# Patient Record
Sex: FEMALE | URBAN - METROPOLITAN AREA
[De-identification: names, ages, dates, MRNs, and addresses within clinical notes are randomized per-mention and may not be internally consistent; named-entity substitution may affect disease eponyms.]

---

## 2017-06-09 ENCOUNTER — EMERGENCY (EMERGENCY)
Facility: HOSPITAL | Age: 27
LOS: 1 days | Discharge: HOME | End: 2017-06-09
Attending: EMERGENCY MEDICINE
Payer: MEDICAID

## 2017-06-09 VITALS
RESPIRATION RATE: 16 BRPM | WEIGHT: 154.98 LBS | HEIGHT: 64 IN | OXYGEN SATURATION: 99 % | DIASTOLIC BLOOD PRESSURE: 83 MMHG | SYSTOLIC BLOOD PRESSURE: 120 MMHG | HEART RATE: 76 BPM | TEMPERATURE: 98 F

## 2017-06-09 DIAGNOSIS — R55 SYNCOPE AND COLLAPSE: ICD-10-CM

## 2017-06-09 LAB
ALBUMIN SERPL ELPH-MCNC: 4.6 G/DL — SIGNIFICANT CHANGE UP (ref 3.3–5.2)
ALP SERPL-CCNC: 96 U/L — SIGNIFICANT CHANGE UP (ref 40–120)
ALT FLD-CCNC: 17 U/L — SIGNIFICANT CHANGE UP
ANION GAP SERPL CALC-SCNC: 14 MMOL/L — SIGNIFICANT CHANGE UP (ref 5–17)
AST SERPL-CCNC: 15 U/L — SIGNIFICANT CHANGE UP
BASOPHILS # BLD AUTO: 0 K/UL — SIGNIFICANT CHANGE UP (ref 0–0.2)
BASOPHILS NFR BLD AUTO: 0.2 % — SIGNIFICANT CHANGE UP (ref 0–2)
BILIRUB SERPL-MCNC: 0.5 MG/DL — SIGNIFICANT CHANGE UP (ref 0.4–2)
BUN SERPL-MCNC: 19 MG/DL — SIGNIFICANT CHANGE UP (ref 8–20)
CALCIUM SERPL-MCNC: 9.3 MG/DL — SIGNIFICANT CHANGE UP (ref 8.6–10.2)
CHLORIDE SERPL-SCNC: 99 MMOL/L — SIGNIFICANT CHANGE UP (ref 98–107)
CO2 SERPL-SCNC: 26 MMOL/L — SIGNIFICANT CHANGE UP (ref 22–29)
CREAT SERPL-MCNC: 0.61 MG/DL — SIGNIFICANT CHANGE UP (ref 0.5–1.3)
EOSINOPHIL # BLD AUTO: 0 K/UL — SIGNIFICANT CHANGE UP (ref 0–0.5)
EOSINOPHIL NFR BLD AUTO: 0.2 % — SIGNIFICANT CHANGE UP (ref 0–6)
GLUCOSE SERPL-MCNC: 91 MG/DL — SIGNIFICANT CHANGE UP (ref 70–115)
HCG UR QL: NEGATIVE — SIGNIFICANT CHANGE UP
HCT VFR BLD CALC: 41.9 % — SIGNIFICANT CHANGE UP (ref 37–47)
HGB BLD-MCNC: 14.1 G/DL — SIGNIFICANT CHANGE UP (ref 12–16)
LYMPHOCYTES # BLD AUTO: 1.3 K/UL — SIGNIFICANT CHANGE UP (ref 1–4.8)
LYMPHOCYTES # BLD AUTO: 9.2 % — LOW (ref 20–55)
MCHC RBC-ENTMCNC: 29.6 PG — SIGNIFICANT CHANGE UP (ref 27–31)
MCHC RBC-ENTMCNC: 33.7 G/DL — SIGNIFICANT CHANGE UP (ref 32–36)
MCV RBC AUTO: 87.8 FL — SIGNIFICANT CHANGE UP (ref 81–99)
MONOCYTES # BLD AUTO: 1 K/UL — HIGH (ref 0–0.8)
MONOCYTES NFR BLD AUTO: 6.8 % — SIGNIFICANT CHANGE UP (ref 3–10)
NEUTROPHILS # BLD AUTO: 12 K/UL — HIGH (ref 1.8–8)
NEUTROPHILS NFR BLD AUTO: 83.3 % — HIGH (ref 37–73)
PLATELET # BLD AUTO: 306 K/UL — SIGNIFICANT CHANGE UP (ref 150–400)
POTASSIUM SERPL-MCNC: 4.2 MMOL/L — SIGNIFICANT CHANGE UP (ref 3.5–5.3)
POTASSIUM SERPL-SCNC: 4.2 MMOL/L — SIGNIFICANT CHANGE UP (ref 3.5–5.3)
PROT SERPL-MCNC: 8.3 G/DL — SIGNIFICANT CHANGE UP (ref 6.6–8.7)
RBC # BLD: 4.77 M/UL — SIGNIFICANT CHANGE UP (ref 4.4–5.2)
RBC # FLD: 12.6 % — SIGNIFICANT CHANGE UP (ref 11–15.6)
SODIUM SERPL-SCNC: 139 MMOL/L — SIGNIFICANT CHANGE UP (ref 135–145)
WBC # BLD: 14.4 K/UL — HIGH (ref 4.8–10.8)
WBC # FLD AUTO: 14.4 K/UL — HIGH (ref 4.8–10.8)

## 2017-06-09 PROCEDURE — 99284 EMERGENCY DEPT VISIT MOD MDM: CPT

## 2017-06-09 PROCEDURE — 81025 URINE PREGNANCY TEST: CPT

## 2017-06-09 PROCEDURE — 93010 ELECTROCARDIOGRAM REPORT: CPT

## 2017-06-09 PROCEDURE — 85027 COMPLETE CBC AUTOMATED: CPT

## 2017-06-09 PROCEDURE — 93005 ELECTROCARDIOGRAM TRACING: CPT

## 2017-06-09 PROCEDURE — 82962 GLUCOSE BLOOD TEST: CPT

## 2017-06-09 PROCEDURE — 80053 COMPREHEN METABOLIC PANEL: CPT

## 2017-06-09 PROCEDURE — 99284 EMERGENCY DEPT VISIT MOD MDM: CPT | Mod: 25

## 2017-06-09 RX ORDER — SODIUM CHLORIDE 9 MG/ML
1000 INJECTION INTRAMUSCULAR; INTRAVENOUS; SUBCUTANEOUS ONCE
Qty: 0 | Refills: 0 | Status: COMPLETED | OUTPATIENT
Start: 2017-06-09 | End: 2017-06-09

## 2017-06-09 RX ADMIN — SODIUM CHLORIDE 2000 MILLILITER(S): 9 INJECTION INTRAMUSCULAR; INTRAVENOUS; SUBCUTANEOUS at 13:21

## 2017-06-09 NOTE — ED PROVIDER NOTE - OBJECTIVE STATEMENT
27 yo female PMHx of "some mild mitral regurg," not currently under any treatment; presents with 1 episode of syncope approx 30 min prior to arrival; patient currently on z rudolph for otitis; has been experiencing periodic abdominal pain since beginning antibx; today was walking in the trotter after using bathroom, suddenly felt weak and lightheaded, sweaty, collapsed against the wall, and then to ground; recovered fully in "a few minutes." No feeling back to normal. Never experience chest pain or SOB.

## 2017-06-09 NOTE — ED PROVIDER NOTE - MEDICAL DECISION MAKING DETAILS
Labs normal, low risk syncope in otherwise young healthy woman; feels well in ED; precautions discussed, OK for d/c.

## 2017-06-09 NOTE — ED PROVIDER NOTE - INTERPRETATION
normal sinus rhythm, Normal axis, Normal MN interval and QRS complex. There are no acute ischemic ST or T-wave changes.

## 2017-06-09 NOTE — ED ADULT TRIAGE NOTE - CHIEF COMPLAINT QUOTE
pt reports "I had ear pain so my doctor started me on abx. today, I passed out at work. I have a leaky valve in my heart." pt has no complaints at this time. sitting calm in chair. a and o x3. breathing even and unlabored.

## 2017-06-09 NOTE — ED ADULT NURSE NOTE - OBJECTIVE STATEMENT
pt received in b 9 L accomapnied by parents. pt is awake alert oriented following commands and speaking coherently. pt presents to er s/p syncopal episode while at work. pt states she is currently being treated for ear infection with abx. she states that she had syncopal episode at work, did not hit head. resp even and unlabored, lung sounds cta bl. hx of leaky valve as per pt. denies nausea vomiting fever chills chest pain sob headache lightheadedness abdominal pain and urinary problems.

## 2017-10-16 ENCOUNTER — OTHER (OUTPATIENT)
Age: 27
End: 2017-10-16

## 2017-10-16 DIAGNOSIS — M25.561 PAIN IN RIGHT KNEE: ICD-10-CM

## 2017-10-16 DIAGNOSIS — M25.562 PAIN IN RIGHT KNEE: ICD-10-CM

## 2017-10-23 ENCOUNTER — APPOINTMENT (OUTPATIENT)
Dept: ORTHOPEDIC SURGERY | Facility: CLINIC | Age: 27
End: 2017-10-23
Payer: MEDICAID

## 2017-10-23 VITALS
WEIGHT: 155 LBS | BODY MASS INDEX: 26.46 KG/M2 | DIASTOLIC BLOOD PRESSURE: 81 MMHG | HEIGHT: 64 IN | SYSTOLIC BLOOD PRESSURE: 117 MMHG | HEART RATE: 71 BPM

## 2017-10-23 DIAGNOSIS — Z86.79 PERSONAL HISTORY OF OTHER DISEASES OF THE CIRCULATORY SYSTEM: ICD-10-CM

## 2017-10-23 DIAGNOSIS — M22.42 CHONDROMALACIA PATELLAE, LEFT KNEE: ICD-10-CM

## 2017-10-23 DIAGNOSIS — Z78.9 OTHER SPECIFIED HEALTH STATUS: ICD-10-CM

## 2017-10-23 DIAGNOSIS — M22.41 CHONDROMALACIA PATELLAE, RIGHT KNEE: ICD-10-CM

## 2017-10-23 PROCEDURE — 99203 OFFICE O/P NEW LOW 30 MIN: CPT

## 2017-10-23 PROCEDURE — 73564 X-RAY EXAM KNEE 4 OR MORE: CPT | Mod: 50

## 2017-12-28 ENCOUNTER — OTHER (OUTPATIENT)
Age: 27
End: 2017-12-28

## 2018-01-24 ENCOUNTER — APPOINTMENT (OUTPATIENT)
Dept: OBGYN | Facility: CLINIC | Age: 28
End: 2018-01-24

## 2018-07-11 ENCOUNTER — APPOINTMENT (OUTPATIENT)
Dept: OBGYN | Facility: CLINIC | Age: 28
End: 2018-07-11
Payer: MEDICAID

## 2018-07-11 VITALS
HEART RATE: 84 BPM | DIASTOLIC BLOOD PRESSURE: 85 MMHG | HEIGHT: 64 IN | WEIGHT: 153.19 LBS | SYSTOLIC BLOOD PRESSURE: 128 MMHG | BODY MASS INDEX: 26.15 KG/M2

## 2018-07-11 PROCEDURE — 99395 PREV VISIT EST AGE 18-39: CPT

## 2018-07-12 LAB
C TRACH RRNA SPEC QL NAA+PROBE: NOT DETECTED
HPV HIGH+LOW RISK DNA PNL CVX: NOT DETECTED
N GONORRHOEA RRNA SPEC QL NAA+PROBE: NOT DETECTED
SOURCE AMPLIFICATION: NORMAL

## 2018-07-16 LAB — CYTOLOGY CVX/VAG DOC THIN PREP: NORMAL

## 2018-07-24 PROBLEM — Z78.9 ALCOHOL USE: Status: INACTIVE | Noted: 2017-10-23

## 2018-09-11 PROBLEM — Z85.828: Status: RESOLVED | Noted: 2018-09-11 | Resolved: 2018-09-11

## 2018-09-11 PROBLEM — R01.1 HEART MURMUR: Status: ACTIVE | Noted: 2018-09-11

## 2018-09-11 PROBLEM — Z87.898 FAMILY HISTORY OF UTERINE LEIOMYOMA: Status: ACTIVE | Noted: 2018-09-11

## 2018-09-11 PROBLEM — F41.9 ANXIETY: Status: ACTIVE | Noted: 2018-09-11

## 2018-09-11 PROBLEM — Z80.3 FAMILY HISTORY OF MALIGNANT NEOPLASM OF BREAST: Status: ACTIVE | Noted: 2018-09-11

## 2018-09-11 PROBLEM — Z86.19 HISTORY OF HPV INFECTION: Status: RESOLVED | Noted: 2018-09-11 | Resolved: 2018-09-11

## 2018-09-12 ENCOUNTER — APPOINTMENT (OUTPATIENT)
Dept: OBGYN | Facility: CLINIC | Age: 28
End: 2018-09-12
Payer: MEDICAID

## 2018-09-12 ENCOUNTER — TRANSCRIPTION ENCOUNTER (OUTPATIENT)
Age: 28
End: 2018-09-12

## 2018-09-12 ENCOUNTER — NON-APPOINTMENT (OUTPATIENT)
Age: 28
End: 2018-09-12

## 2018-09-12 VITALS
SYSTOLIC BLOOD PRESSURE: 141 MMHG | BODY MASS INDEX: 26.36 KG/M2 | WEIGHT: 154.38 LBS | HEART RATE: 94 BPM | DIASTOLIC BLOOD PRESSURE: 86 MMHG | HEIGHT: 64 IN

## 2018-09-12 DIAGNOSIS — Z86.19 PERSONAL HISTORY OF OTHER INFECTIOUS AND PARASITIC DISEASES: ICD-10-CM

## 2018-09-12 DIAGNOSIS — Z87.898 PERSONAL HISTORY OF OTHER SPECIFIED CONDITIONS: ICD-10-CM

## 2018-09-12 DIAGNOSIS — R01.1 CARDIAC MURMUR, UNSPECIFIED: ICD-10-CM

## 2018-09-12 DIAGNOSIS — F41.9 ANXIETY DISORDER, UNSPECIFIED: ICD-10-CM

## 2018-09-12 DIAGNOSIS — Z80.3 FAMILY HISTORY OF MALIGNANT NEOPLASM OF BREAST: ICD-10-CM

## 2018-09-12 DIAGNOSIS — Z85.828 PERSONAL HISTORY OF OTHER MALIGNANT NEOPLASM OF SKIN: ICD-10-CM

## 2018-09-12 PROCEDURE — 36415 COLL VENOUS BLD VENIPUNCTURE: CPT

## 2018-09-12 PROCEDURE — 99214 OFFICE O/P EST MOD 30 MIN: CPT

## 2018-09-13 LAB
ABO + RH PNL BLD: NORMAL
B19V IGG SER QL IA: 0.1 INDEX
B19V IGG+IGM SER-IMP: NEGATIVE
B19V IGG+IGM SER-IMP: NORMAL
B19V IGM FLD-ACNC: 0.1 INDEX
B19V IGM SER-ACNC: NEGATIVE
BASOPHILS # BLD AUTO: 0.02 K/UL
BASOPHILS NFR BLD AUTO: 0.2 %
BLD GP AB SCN SERPL QL: NORMAL
EOSINOPHIL # BLD AUTO: 0.08 K/UL
EOSINOPHIL NFR BLD AUTO: 0.8 %
HBA1C MFR BLD HPLC: 5 %
HBV SURFACE AG SER QL: NONREACTIVE
HCG SERPL-MCNC: ABNORMAL MIU/ML
HCT VFR BLD CALC: 38.3 %
HGB BLD-MCNC: 12.5 G/DL
HIV1+2 AB SPEC QL IA.RAPID: NONREACTIVE
IMM GRANULOCYTES NFR BLD AUTO: 0.2 %
LYMPHOCYTES # BLD AUTO: 1.64 K/UL
LYMPHOCYTES NFR BLD AUTO: 16.4 %
MAN DIFF?: NORMAL
MCHC RBC-ENTMCNC: 28.9 PG
MCHC RBC-ENTMCNC: 32.6 GM/DL
MCV RBC AUTO: 88.5 FL
MONOCYTES # BLD AUTO: 1.03 K/UL
MONOCYTES NFR BLD AUTO: 10.3 %
NEUTROPHILS # BLD AUTO: 7.19 K/UL
NEUTROPHILS NFR BLD AUTO: 72.1 %
PLATELET # BLD AUTO: 266 K/UL
RBC # BLD: 4.33 M/UL
RBC # FLD: 13.5 %
WBC # FLD AUTO: 9.98 K/UL

## 2018-09-14 LAB
BACTERIA UR CULT: NORMAL
M TB IFN-G BLD-IMP: NEGATIVE
QUANTIFERON TB PLUS MITOGEN MINUS NIL: 8.29 IU/ML
QUANTIFERON TB PLUS NIL: 0.01 IU/ML
QUANTIFERON TB PLUS TB1 MINUS NIL: 0 IU/ML
QUANTIFERON TB PLUS TB2 MINUS NIL: 0 IU/ML

## 2018-09-17 LAB
HGB A MFR BLD: 97.3 %
HGB A2 MFR BLD: 2.7 %
HGB FRACT BLD-IMP: NORMAL
MEV IGG FLD QL IA: 103 AU/ML
MEV IGG+IGM SER-IMP: POSITIVE
MUV AB SER-ACNC: POSITIVE
MUV IGG SER QL IA: 85.6 AU/ML
RUBV IGG FLD-ACNC: 6.5 INDEX
RUBV IGG SER-IMP: POSITIVE
T PALLIDUM AB SER QL IA: NEGATIVE
VZV AB TITR SER: POSITIVE
VZV IGG SER IF-ACNC: 705 INDEX

## 2018-09-20 LAB
AR GENE MUT ANL BLD/T: NEGATIVE
CFTR MUT TESTED BLD/T: NEGATIVE
FMR1 GENE MUT ANL BLD/T: NORMAL

## 2018-09-26 ENCOUNTER — NON-APPOINTMENT (OUTPATIENT)
Age: 28
End: 2018-09-26

## 2018-09-26 ENCOUNTER — APPOINTMENT (OUTPATIENT)
Dept: OBGYN | Facility: CLINIC | Age: 28
End: 2018-09-26
Payer: MEDICAID

## 2018-09-26 VITALS
HEART RATE: 97 BPM | SYSTOLIC BLOOD PRESSURE: 146 MMHG | HEIGHT: 64 IN | WEIGHT: 156 LBS | DIASTOLIC BLOOD PRESSURE: 86 MMHG | BODY MASS INDEX: 26.63 KG/M2

## 2018-09-26 PROCEDURE — 0502F SUBSEQUENT PRENATAL CARE: CPT

## 2018-10-02 ENCOUNTER — TRANSCRIPTION ENCOUNTER (OUTPATIENT)
Age: 28
End: 2018-10-02

## 2018-10-02 DIAGNOSIS — Z3A.10 10 WEEKS GESTATION OF PREGNANCY: ICD-10-CM

## 2018-10-02 DIAGNOSIS — Z3A.01 LESS THAN 8 WEEKS GESTATION OF PREGNANCY: ICD-10-CM

## 2018-10-10 ENCOUNTER — APPOINTMENT (OUTPATIENT)
Dept: OBGYN | Facility: CLINIC | Age: 28
End: 2018-10-10
Payer: MEDICAID

## 2018-10-10 ENCOUNTER — NON-APPOINTMENT (OUTPATIENT)
Age: 28
End: 2018-10-10

## 2018-10-10 VITALS
SYSTOLIC BLOOD PRESSURE: 120 MMHG | HEART RATE: 94 BPM | WEIGHT: 155.13 LBS | HEIGHT: 64 IN | DIASTOLIC BLOOD PRESSURE: 77 MMHG | BODY MASS INDEX: 26.49 KG/M2

## 2018-10-10 PROCEDURE — 0502F SUBSEQUENT PRENATAL CARE: CPT

## 2018-10-11 DIAGNOSIS — Z3A.13 13 WEEKS GESTATION OF PREGNANCY: ICD-10-CM

## 2018-10-11 DIAGNOSIS — Z28.21 IMMUNIZATION NOT CARRIED OUT BECAUSE OF PATIENT REFUSAL: ICD-10-CM

## 2018-11-01 ENCOUNTER — OUTPATIENT (OUTPATIENT)
Dept: OUTPATIENT SERVICES | Facility: HOSPITAL | Age: 28
LOS: 1 days | End: 2018-11-01
Payer: MEDICAID

## 2018-11-01 VITALS
TEMPERATURE: 98 F | SYSTOLIC BLOOD PRESSURE: 120 MMHG | DIASTOLIC BLOOD PRESSURE: 83 MMHG | HEIGHT: 65 IN | WEIGHT: 154.32 LBS | RESPIRATION RATE: 18 BRPM | HEART RATE: 98 BPM

## 2018-11-01 DIAGNOSIS — Z01.818 ENCOUNTER FOR OTHER PREPROCEDURAL EXAMINATION: ICD-10-CM

## 2018-11-01 DIAGNOSIS — Z98.890 OTHER SPECIFIED POSTPROCEDURAL STATES: Chronic | ICD-10-CM

## 2018-11-01 DIAGNOSIS — Z29.9 ENCOUNTER FOR PROPHYLACTIC MEASURES, UNSPECIFIED: ICD-10-CM

## 2018-11-01 DIAGNOSIS — O26.879 CERVICAL SHORTENING, UNSPECIFIED TRIMESTER: ICD-10-CM

## 2018-11-01 DIAGNOSIS — Z98.891 HISTORY OF UTERINE SCAR FROM PREVIOUS SURGERY: Chronic | ICD-10-CM

## 2018-11-01 LAB
ANION GAP SERPL CALC-SCNC: 12 MMOL/L — SIGNIFICANT CHANGE UP (ref 5–17)
APTT BLD: 32 SEC — SIGNIFICANT CHANGE UP (ref 27.5–36.3)
BASOPHILS # BLD AUTO: 0 K/UL — SIGNIFICANT CHANGE UP (ref 0–0.2)
BASOPHILS NFR BLD AUTO: 0.2 % — SIGNIFICANT CHANGE UP (ref 0–2)
BUN SERPL-MCNC: 9 MG/DL — SIGNIFICANT CHANGE UP (ref 8–20)
CALCIUM SERPL-MCNC: 9.1 MG/DL — SIGNIFICANT CHANGE UP (ref 8.6–10.2)
CHLORIDE SERPL-SCNC: 100 MMOL/L — SIGNIFICANT CHANGE UP (ref 98–107)
CO2 SERPL-SCNC: 25 MMOL/L — SIGNIFICANT CHANGE UP (ref 22–29)
CREAT SERPL-MCNC: 0.43 MG/DL — LOW (ref 0.5–1.3)
EOSINOPHIL # BLD AUTO: 0 K/UL — SIGNIFICANT CHANGE UP (ref 0–0.5)
EOSINOPHIL NFR BLD AUTO: 0.4 % — SIGNIFICANT CHANGE UP (ref 0–6)
GLUCOSE SERPL-MCNC: 92 MG/DL — SIGNIFICANT CHANGE UP (ref 70–115)
HCT VFR BLD CALC: 37.5 % — SIGNIFICANT CHANGE UP (ref 37–47)
HGB BLD-MCNC: 12.5 G/DL — SIGNIFICANT CHANGE UP (ref 12–16)
INR BLD: 1.04 RATIO — SIGNIFICANT CHANGE UP (ref 0.88–1.16)
LYMPHOCYTES # BLD AUTO: 1.2 K/UL — SIGNIFICANT CHANGE UP (ref 1–4.8)
LYMPHOCYTES # BLD AUTO: 12.2 % — LOW (ref 20–55)
MCHC RBC-ENTMCNC: 29.5 PG — SIGNIFICANT CHANGE UP (ref 27–31)
MCHC RBC-ENTMCNC: 33.3 G/DL — SIGNIFICANT CHANGE UP (ref 32–36)
MCV RBC AUTO: 88.4 FL — SIGNIFICANT CHANGE UP (ref 81–99)
MONOCYTES # BLD AUTO: 0.8 K/UL — SIGNIFICANT CHANGE UP (ref 0–0.8)
MONOCYTES NFR BLD AUTO: 7.9 % — SIGNIFICANT CHANGE UP (ref 3–10)
NEUTROPHILS # BLD AUTO: 7.7 K/UL — SIGNIFICANT CHANGE UP (ref 1.8–8)
NEUTROPHILS NFR BLD AUTO: 78.9 % — HIGH (ref 37–73)
PLATELET # BLD AUTO: 272 K/UL — SIGNIFICANT CHANGE UP (ref 150–400)
POTASSIUM SERPL-MCNC: 4.1 MMOL/L — SIGNIFICANT CHANGE UP (ref 3.5–5.3)
POTASSIUM SERPL-SCNC: 4.1 MMOL/L — SIGNIFICANT CHANGE UP (ref 3.5–5.3)
PROTHROM AB SERPL-ACNC: 12 SEC — SIGNIFICANT CHANGE UP (ref 10–12.9)
RBC # BLD: 4.24 M/UL — LOW (ref 4.4–5.2)
RBC # FLD: 13 % — SIGNIFICANT CHANGE UP (ref 11–15.6)
SODIUM SERPL-SCNC: 137 MMOL/L — SIGNIFICANT CHANGE UP (ref 135–145)
WBC # BLD: 9.8 K/UL — SIGNIFICANT CHANGE UP (ref 4.8–10.8)
WBC # FLD AUTO: 9.8 K/UL — SIGNIFICANT CHANGE UP (ref 4.8–10.8)

## 2018-11-01 PROCEDURE — 85730 THROMBOPLASTIN TIME PARTIAL: CPT

## 2018-11-01 PROCEDURE — G0463: CPT

## 2018-11-01 PROCEDURE — 85027 COMPLETE CBC AUTOMATED: CPT

## 2018-11-01 PROCEDURE — 80048 BASIC METABOLIC PNL TOTAL CA: CPT

## 2018-11-01 PROCEDURE — 85610 PROTHROMBIN TIME: CPT

## 2018-11-01 PROCEDURE — 36415 COLL VENOUS BLD VENIPUNCTURE: CPT

## 2018-11-01 RX ORDER — SODIUM CHLORIDE 9 MG/ML
3 INJECTION INTRAMUSCULAR; INTRAVENOUS; SUBCUTANEOUS ONCE
Qty: 0 | Refills: 0 | Status: DISCONTINUED | OUTPATIENT
Start: 2018-11-13 | End: 2018-11-28

## 2018-11-01 NOTE — H&P PST ADULT - HISTORY OF PRESENT ILLNESS
Naun Rivero, 27 y/o female, 3 months pregnancy,  seen today for pre-op cervical cerclage due to cervical shortening. Pt accompanied today with mother. NAD noted.

## 2018-11-01 NOTE — H&P PST ADULT - PSH
H/O cone biopsy of cervix  ?  H/O:   2009 H/O cone biopsy of cervix  ?  H/O:   2009  History of eye surgery  Right eyebrown- 2000

## 2018-11-01 NOTE — H&P PST ADULT - NSANTHOSAYNRD_GEN_A_CORE
No. ABDOULAYE screening performed.  STOP BANG Legend: 0-2 = LOW Risk; 3-4 = INTERMEDIATE Risk; 5-8 = HIGH Risk

## 2018-11-01 NOTE — H&P PST ADULT - FAMILY HISTORY
Mother  Still living? Yes, Estimated age: 41-50  Family history of hypertension in mother, Age at diagnosis: Age Unknown

## 2018-11-01 NOTE — H&P PST ADULT - ASSESSMENT
27 y/o female seen today for pre-op cervical cerclage. Surgery protocol reviewed with pt today.   CAPRINI SCORE [CLOT]    AGE RELATED RISK FACTORS                                                       MOBILITY RELATED FACTORS  [ ] Age 41-60 years                                            (1 Point)                  [ ] Bed rest                                                        (1 Point)  [ ] Age: 61-74 years                                           (2 Points)                 [ ] Plaster cast                                                   (2 Points)  [ ] Age= 75 years                                              (3 Points)                 [ ] Bed bound for more than 72 hours                 (2 Points)    DISEASE RELATED RISK FACTORS                                               GENDER SPECIFIC FACTORS  [x ] Edema in the lower extremities                       (1 Point)                  [x ] Pregnancy                                                     (1 Point)  [ ] Varicose veins                                               (1 Point)                  [ ] Post-partum < 6 weeks                                   (1 Point)             [x ] BMI > 25 Kg/m2                                            (1 Point)                  [ ] Hormonal therapy  or oral contraception          (1 Point)                 [ ] Sepsis (in the previous month)                        (1 Point)                  [ ] History of pregnancy complications                 (1 point)  [ ] Pneumonia or serious lung disease                                               [ ] Unexplained or recurrent                     (1 Point)           (in the previous month)                               (1 Point)  [ ] Abnormal pulmonary function test                     (1 Point)                 SURGERY RELATED RISK FACTORS  [ ] Acute myocardial infarction                              (1 Point)                 [ ]  Section                                             (1 Point)  [ ] Congestive heart failure (in the previous month)  (1 Point)               [ ] Minor surgery                                                  (1 Point)   [ ] Inflammatory bowel disease                             (1 Point)                 [ ] Arthroscopic surgery                                        (2 Points)  [ ] Central venous access                                      (2 Points)                [ x] General surgery lasting more than 45 minutes   (2 Points)       [ ] Stroke (in the previous month)                          (5 Points)               [ ] Elective arthroplasty                                         (5 Points)                                                                                                                                               HEMATOLOGY RELATED FACTORS                                                 TRAUMA RELATED RISK FACTORS  [ ] Prior episodes of VTE                                     (3 Points)                [ ] Fracture of the hip, pelvis, or leg                       (5 Points)  [ ] Positive family history for VTE                         (3 Points)                 [ ] Acute spinal cord injury (in the previous month)  (5 Points)  [ ] Prothrombin 20825 A                                     (3 Points)                 [ ] Paralysis  (less than 1 month)                             (5 Points)  [ ] Factor V Leiden                                             (3 Points)                  [ ] Multiple Trauma within 1 month                        (5 Points)  [ ] Lupus anticoagulants                                     (3 Points)                                                           [ ] Anticardiolipin antibodies                               (3 Points)                                                       [ ] High homocysteine in the blood                      (3 Points)                                             [ ] Other congenital or acquired thrombophilia      (3 Points)                                                [ ] Heparin induced thrombocytopenia                  (3 Points)                                          Total Score [     5     ]

## 2018-11-02 ENCOUNTER — APPOINTMENT (OUTPATIENT)
Dept: OBGYN | Facility: CLINIC | Age: 28
End: 2018-11-02
Payer: MEDICAID

## 2018-11-02 ENCOUNTER — NON-APPOINTMENT (OUTPATIENT)
Age: 28
End: 2018-11-02

## 2018-11-02 VITALS
HEIGHT: 65 IN | BODY MASS INDEX: 26 KG/M2 | WEIGHT: 156.06 LBS | SYSTOLIC BLOOD PRESSURE: 126 MMHG | DIASTOLIC BLOOD PRESSURE: 78 MMHG | HEART RATE: 98 BPM

## 2018-11-02 PROCEDURE — 0502F SUBSEQUENT PRENATAL CARE: CPT

## 2018-11-05 ENCOUNTER — APPOINTMENT (OUTPATIENT)
Dept: OBGYN | Facility: CLINIC | Age: 28
End: 2018-11-05

## 2018-11-07 ENCOUNTER — APPOINTMENT (OUTPATIENT)
Dept: OBGYN | Facility: CLINIC | Age: 28
End: 2018-11-07

## 2018-11-12 ENCOUNTER — TRANSCRIPTION ENCOUNTER (OUTPATIENT)
Age: 28
End: 2018-11-12

## 2018-11-13 ENCOUNTER — OUTPATIENT (OUTPATIENT)
Dept: OUTPATIENT SERVICES | Facility: HOSPITAL | Age: 28
LOS: 1 days | End: 2018-11-13
Payer: MEDICAID

## 2018-11-13 ENCOUNTER — APPOINTMENT (OUTPATIENT)
Dept: OBGYN | Facility: HOSPITAL | Age: 28
End: 2018-11-13

## 2018-11-13 VITALS
OXYGEN SATURATION: 100 % | WEIGHT: 156.09 LBS | SYSTOLIC BLOOD PRESSURE: 116 MMHG | DIASTOLIC BLOOD PRESSURE: 76 MMHG | HEART RATE: 98 BPM | TEMPERATURE: 98 F | HEIGHT: 65 IN | RESPIRATION RATE: 16 BRPM

## 2018-11-13 VITALS
RESPIRATION RATE: 14 BRPM | HEART RATE: 85 BPM | OXYGEN SATURATION: 98 % | DIASTOLIC BLOOD PRESSURE: 69 MMHG | SYSTOLIC BLOOD PRESSURE: 116 MMHG

## 2018-11-13 DIAGNOSIS — Z01.818 ENCOUNTER FOR OTHER PREPROCEDURAL EXAMINATION: ICD-10-CM

## 2018-11-13 DIAGNOSIS — O26.879 CERVICAL SHORTENING, UNSPECIFIED TRIMESTER: ICD-10-CM

## 2018-11-13 DIAGNOSIS — Z98.890 OTHER SPECIFIED POSTPROCEDURAL STATES: Chronic | ICD-10-CM

## 2018-11-13 DIAGNOSIS — Z98.891 HISTORY OF UTERINE SCAR FROM PREVIOUS SURGERY: Chronic | ICD-10-CM

## 2018-11-13 PROCEDURE — 59320 REVISION OF CERVIX: CPT

## 2018-11-13 RX ORDER — IBUPROFEN 200 MG
1 TABLET ORAL
Qty: 28 | Refills: 0 | OUTPATIENT
Start: 2018-11-13 | End: 2018-11-19

## 2018-11-13 RX ORDER — SODIUM CHLORIDE 9 MG/ML
1000 INJECTION, SOLUTION INTRAVENOUS
Qty: 0 | Refills: 0 | Status: DISCONTINUED | OUTPATIENT
Start: 2018-11-13 | End: 2018-11-13

## 2018-11-13 RX ORDER — ONDANSETRON 8 MG/1
4 TABLET, FILM COATED ORAL ONCE
Qty: 0 | Refills: 0 | Status: DISCONTINUED | OUTPATIENT
Start: 2018-11-13 | End: 2018-11-13

## 2018-11-13 RX ORDER — FENTANYL CITRATE 50 UG/ML
25 INJECTION INTRAVENOUS
Qty: 0 | Refills: 0 | Status: DISCONTINUED | OUTPATIENT
Start: 2018-11-13 | End: 2018-11-13

## 2018-11-13 NOTE — ASU DISCHARGE PLAN (ADULT/PEDIATRIC). - ACTIVITY LEVEL
weight bearing as tolerated/no tampons/no heavy lifting/nothing per rectum/no tub baths/no intercourse/no douching/nothing per vagina

## 2018-11-13 NOTE — BRIEF OPERATIVE NOTE - PROCEDURE
<<-----Click on this checkbox to enter Procedure Cervical cerclage  11/13/2018  cervical incompitance  Active  YEISON

## 2018-11-13 NOTE — ASU DISCHARGE PLAN (ADULT/PEDIATRIC). - MEDICATION SUMMARY - MEDICATIONS TO TAKE
I will START or STAY ON the medications listed below when I get home from the hospital:    ibuprofen 600 mg oral tablet  -- 1 tab(s) by mouth every 6 hours, As Needed -for mild pain   -- Do not take this drug if you are pregnant.  It is very important that you take or use this exactly as directed.  Do not skip doses or discontinue unless directed by your doctor.  May cause drowsiness or dizziness.  Obtain medical advice before taking any non-prescription drugs as some may affect the action of this medication.  Take with food or milk.    -- Indication: For pain

## 2018-11-13 NOTE — ASU DISCHARGE PLAN (ADULT/PEDIATRIC). - NOTIFY
Bleeding that does not stop/Inability to Tolerate Liquids or Foods/Pain not relieved by Medications/GYN Fever>100.4/Persistent Nausea and Vomiting/Unable to Urinate

## 2018-11-16 ENCOUNTER — APPOINTMENT (OUTPATIENT)
Dept: OBGYN | Facility: CLINIC | Age: 28
End: 2018-11-16
Payer: MEDICAID

## 2018-11-16 ENCOUNTER — NON-APPOINTMENT (OUTPATIENT)
Age: 28
End: 2018-11-16

## 2018-11-16 VITALS
SYSTOLIC BLOOD PRESSURE: 119 MMHG | HEIGHT: 65 IN | DIASTOLIC BLOOD PRESSURE: 77 MMHG | BODY MASS INDEX: 26.16 KG/M2 | HEART RATE: 110 BPM | WEIGHT: 157 LBS

## 2018-11-16 PROBLEM — S89.90XA UNSPECIFIED INJURY OF UNSPECIFIED LOWER LEG, INITIAL ENCOUNTER: Chronic | Status: ACTIVE | Noted: 2018-11-01

## 2018-11-16 PROCEDURE — 0502F SUBSEQUENT PRENATAL CARE: CPT

## 2018-12-10 ENCOUNTER — APPOINTMENT (OUTPATIENT)
Dept: OBGYN | Facility: CLINIC | Age: 28
End: 2018-12-10
Payer: MEDICAID

## 2018-12-10 ENCOUNTER — NON-APPOINTMENT (OUTPATIENT)
Age: 28
End: 2018-12-10

## 2018-12-10 VITALS
DIASTOLIC BLOOD PRESSURE: 84 MMHG | WEIGHT: 159 LBS | HEART RATE: 94 BPM | SYSTOLIC BLOOD PRESSURE: 129 MMHG | BODY MASS INDEX: 26.49 KG/M2 | HEIGHT: 65 IN

## 2018-12-10 PROCEDURE — 0502F SUBSEQUENT PRENATAL CARE: CPT

## 2018-12-14 ENCOUNTER — APPOINTMENT (OUTPATIENT)
Dept: ANTEPARTUM | Facility: CLINIC | Age: 28
End: 2018-12-14
Payer: MEDICAID

## 2018-12-14 ENCOUNTER — ASOB RESULT (OUTPATIENT)
Age: 28
End: 2018-12-14

## 2018-12-14 PROCEDURE — 76811 OB US DETAILED SNGL FETUS: CPT

## 2018-12-14 PROCEDURE — 76817 TRANSVAGINAL US OBSTETRIC: CPT

## 2018-12-17 DIAGNOSIS — Z3A.14 14 WEEKS GESTATION OF PREGNANCY: ICD-10-CM

## 2018-12-17 DIAGNOSIS — Z3A.19 19 WEEKS GESTATION OF PREGNANCY: ICD-10-CM

## 2018-12-17 DIAGNOSIS — Z3A.16 16 WEEKS GESTATION OF PREGNANCY: ICD-10-CM

## 2018-12-20 PROBLEM — Z3A.14 14 WEEKS GESTATION OF PREGNANCY: Status: RESOLVED | Noted: 2018-11-02 | Resolved: 2018-12-20

## 2018-12-20 PROBLEM — Z3A.19 19 WEEKS GESTATION OF PREGNANCY: Status: RESOLVED | Noted: 2018-12-10 | Resolved: 2018-12-20

## 2018-12-20 PROBLEM — Z3A.16 16 WEEKS GESTATION OF PREGNANCY: Status: RESOLVED | Noted: 2018-11-16 | Resolved: 2018-12-20

## 2019-01-07 ENCOUNTER — APPOINTMENT (OUTPATIENT)
Dept: OBGYN | Facility: CLINIC | Age: 29
End: 2019-01-07
Payer: MEDICAID

## 2019-01-07 ENCOUNTER — NON-APPOINTMENT (OUTPATIENT)
Age: 29
End: 2019-01-07

## 2019-01-07 VITALS
SYSTOLIC BLOOD PRESSURE: 124 MMHG | DIASTOLIC BLOOD PRESSURE: 78 MMHG | WEIGHT: 163 LBS | BODY MASS INDEX: 27.16 KG/M2 | HEART RATE: 95 BPM | HEIGHT: 65 IN

## 2019-01-07 PROCEDURE — 0502F SUBSEQUENT PRENATAL CARE: CPT

## 2019-01-27 ENCOUNTER — LABORATORY RESULT (OUTPATIENT)
Age: 29
End: 2019-01-27

## 2019-01-28 ENCOUNTER — NON-APPOINTMENT (OUTPATIENT)
Age: 29
End: 2019-01-28

## 2019-01-28 ENCOUNTER — APPOINTMENT (OUTPATIENT)
Dept: OBGYN | Facility: CLINIC | Age: 29
End: 2019-01-28
Payer: MEDICAID

## 2019-01-28 VITALS
HEART RATE: 125 BPM | WEIGHT: 170.38 LBS | SYSTOLIC BLOOD PRESSURE: 133 MMHG | HEIGHT: 65 IN | BODY MASS INDEX: 28.39 KG/M2 | DIASTOLIC BLOOD PRESSURE: 88 MMHG

## 2019-01-28 PROCEDURE — 0502F SUBSEQUENT PRENATAL CARE: CPT

## 2019-01-29 LAB
BASOPHILS # BLD AUTO: 0.03 K/UL
BASOPHILS NFR BLD AUTO: 0.3 %
EOSINOPHIL # BLD AUTO: 0.08 K/UL
EOSINOPHIL NFR BLD AUTO: 0.7 %
GLUCOSE 1H P 50 G GLC PO SERPL-MCNC: 101 MG/DL
HCT VFR BLD CALC: 36.4 %
HGB BLD-MCNC: 11.8 G/DL
IMM GRANULOCYTES NFR BLD AUTO: 0.4 %
LYMPHOCYTES # BLD AUTO: 1.59 K/UL
LYMPHOCYTES NFR BLD AUTO: 14.2 %
MAN DIFF?: NORMAL
MCHC RBC-ENTMCNC: 28.8 PG
MCHC RBC-ENTMCNC: 32.4 GM/DL
MCV RBC AUTO: 88.8 FL
MONOCYTES # BLD AUTO: 1.17 K/UL
MONOCYTES NFR BLD AUTO: 10.4 %
NEUTROPHILS # BLD AUTO: 8.3 K/UL
NEUTROPHILS NFR BLD AUTO: 74 %
PLATELET # BLD AUTO: 247 K/UL
RBC # BLD: 4.1 M/UL
RBC # FLD: 13.9 %
WBC # FLD AUTO: 11.22 K/UL

## 2019-02-05 ENCOUNTER — ASOB RESULT (OUTPATIENT)
Age: 29
End: 2019-02-05

## 2019-02-05 ENCOUNTER — APPOINTMENT (OUTPATIENT)
Dept: ANTEPARTUM | Facility: CLINIC | Age: 29
End: 2019-02-05
Payer: MEDICAID

## 2019-02-05 PROCEDURE — 76816 OB US FOLLOW-UP PER FETUS: CPT

## 2019-02-05 PROCEDURE — 76819 FETAL BIOPHYS PROFIL W/O NST: CPT

## 2019-02-11 DIAGNOSIS — Z3A.23 23 WEEKS GESTATION OF PREGNANCY: ICD-10-CM

## 2019-02-11 DIAGNOSIS — Z3A.25 25 WEEKS GESTATION OF PREGNANCY: ICD-10-CM

## 2019-02-18 ENCOUNTER — APPOINTMENT (OUTPATIENT)
Dept: OBGYN | Facility: CLINIC | Age: 29
End: 2019-02-18
Payer: MEDICAID

## 2019-02-18 ENCOUNTER — NON-APPOINTMENT (OUTPATIENT)
Age: 29
End: 2019-02-18

## 2019-02-18 VITALS
HEART RATE: 112 BPM | WEIGHT: 173.31 LBS | DIASTOLIC BLOOD PRESSURE: 86 MMHG | BODY MASS INDEX: 28.87 KG/M2 | SYSTOLIC BLOOD PRESSURE: 140 MMHG | HEIGHT: 65 IN

## 2019-02-18 PROCEDURE — 0502F SUBSEQUENT PRENATAL CARE: CPT

## 2019-02-19 ENCOUNTER — RX RENEWAL (OUTPATIENT)
Age: 29
End: 2019-02-19

## 2019-02-21 ENCOUNTER — EMERGENCY (EMERGENCY)
Facility: HOSPITAL | Age: 29
LOS: 1 days | Discharge: HOME | End: 2019-02-21
Attending: STUDENT IN AN ORGANIZED HEALTH CARE EDUCATION/TRAINING PROGRAM
Payer: MEDICAID

## 2019-02-21 ENCOUNTER — OUTPATIENT (OUTPATIENT)
Dept: INPATIENT UNIT | Facility: HOSPITAL | Age: 29
LOS: 1 days | End: 2019-02-21
Payer: MEDICAID

## 2019-02-21 VITALS
DIASTOLIC BLOOD PRESSURE: 92 MMHG | HEART RATE: 113 BPM | RESPIRATION RATE: 18 BRPM | TEMPERATURE: 98 F | WEIGHT: 173.06 LBS | HEIGHT: 63 IN | SYSTOLIC BLOOD PRESSURE: 127 MMHG | OXYGEN SATURATION: 98 %

## 2019-02-21 VITALS
SYSTOLIC BLOOD PRESSURE: 117 MMHG | HEART RATE: 100 BPM | DIASTOLIC BLOOD PRESSURE: 69 MMHG | RESPIRATION RATE: 18 BRPM | TEMPERATURE: 98 F

## 2019-02-21 VITALS — SYSTOLIC BLOOD PRESSURE: 120 MMHG | HEART RATE: 100 BPM | DIASTOLIC BLOOD PRESSURE: 70 MMHG

## 2019-02-21 VITALS
SYSTOLIC BLOOD PRESSURE: 129 MMHG | HEART RATE: 90 BPM | DIASTOLIC BLOOD PRESSURE: 77 MMHG | RESPIRATION RATE: 18 BRPM | OXYGEN SATURATION: 98 % | TEMPERATURE: 99 F

## 2019-02-21 DIAGNOSIS — Z98.891 HISTORY OF UTERINE SCAR FROM PREVIOUS SURGERY: Chronic | ICD-10-CM

## 2019-02-21 DIAGNOSIS — Z98.890 OTHER SPECIFIED POSTPROCEDURAL STATES: Chronic | ICD-10-CM

## 2019-02-21 DIAGNOSIS — O34.30 MATERNAL CARE FOR CERVICAL INCOMPETENCE, UNSPECIFIED TRIMESTER: Chronic | ICD-10-CM

## 2019-02-21 DIAGNOSIS — O47.03 FALSE LABOR BEFORE 37 COMPLETED WEEKS OF GESTATION, THIRD TRIMESTER: ICD-10-CM

## 2019-02-21 LAB
ALBUMIN SERPL ELPH-MCNC: 3.1 G/DL — LOW (ref 3.3–5.2)
ALP SERPL-CCNC: 89 U/L — SIGNIFICANT CHANGE UP (ref 40–120)
ALT FLD-CCNC: 10 U/L — SIGNIFICANT CHANGE UP
ANION GAP SERPL CALC-SCNC: 13 MMOL/L — SIGNIFICANT CHANGE UP (ref 5–17)
AST SERPL-CCNC: 13 U/L — SIGNIFICANT CHANGE UP
BASOPHILS # BLD AUTO: 0 K/UL — SIGNIFICANT CHANGE UP (ref 0–0.2)
BASOPHILS NFR BLD AUTO: 0.1 % — SIGNIFICANT CHANGE UP (ref 0–2)
BILIRUB SERPL-MCNC: <0.2 MG/DL — LOW (ref 0.4–2)
BUN SERPL-MCNC: 9 MG/DL — SIGNIFICANT CHANGE UP (ref 8–20)
CALCIUM SERPL-MCNC: 8.4 MG/DL — LOW (ref 8.6–10.2)
CHLORIDE SERPL-SCNC: 103 MMOL/L — SIGNIFICANT CHANGE UP (ref 98–107)
CO2 SERPL-SCNC: 21 MMOL/L — LOW (ref 22–29)
CREAT SERPL-MCNC: 0.36 MG/DL — LOW (ref 0.5–1.3)
EOSINOPHIL # BLD AUTO: 0 K/UL — SIGNIFICANT CHANGE UP (ref 0–0.5)
EOSINOPHIL NFR BLD AUTO: 0.5 % — SIGNIFICANT CHANGE UP (ref 0–6)
GLUCOSE SERPL-MCNC: 100 MG/DL — SIGNIFICANT CHANGE UP (ref 70–115)
HCT VFR BLD CALC: 37.3 % — SIGNIFICANT CHANGE UP (ref 37–47)
HGB BLD-MCNC: 12.4 G/DL — SIGNIFICANT CHANGE UP (ref 12–16)
LYMPHOCYTES # BLD AUTO: 1.2 K/UL — SIGNIFICANT CHANGE UP (ref 1–4.8)
LYMPHOCYTES # BLD AUTO: 12 % — LOW (ref 20–55)
MCHC RBC-ENTMCNC: 29.5 PG — SIGNIFICANT CHANGE UP (ref 27–31)
MCHC RBC-ENTMCNC: 33.2 G/DL — SIGNIFICANT CHANGE UP (ref 32–36)
MCV RBC AUTO: 88.6 FL — SIGNIFICANT CHANGE UP (ref 81–99)
MONOCYTES # BLD AUTO: 0.9 K/UL — HIGH (ref 0–0.8)
MONOCYTES NFR BLD AUTO: 8.4 % — SIGNIFICANT CHANGE UP (ref 3–10)
NEUTROPHILS # BLD AUTO: 8 K/UL — SIGNIFICANT CHANGE UP (ref 1.8–8)
NEUTROPHILS NFR BLD AUTO: 77.8 % — HIGH (ref 37–73)
PLATELET # BLD AUTO: 234 K/UL — SIGNIFICANT CHANGE UP (ref 150–400)
POTASSIUM SERPL-MCNC: 3.8 MMOL/L — SIGNIFICANT CHANGE UP (ref 3.5–5.3)
POTASSIUM SERPL-SCNC: 3.8 MMOL/L — SIGNIFICANT CHANGE UP (ref 3.5–5.3)
PROT SERPL-MCNC: 6.8 G/DL — SIGNIFICANT CHANGE UP (ref 6.6–8.7)
RBC # BLD: 4.21 M/UL — LOW (ref 4.4–5.2)
RBC # FLD: 14 % — SIGNIFICANT CHANGE UP (ref 11–15.6)
SODIUM SERPL-SCNC: 137 MMOL/L — SIGNIFICANT CHANGE UP (ref 135–145)
WBC # BLD: 10.3 K/UL — SIGNIFICANT CHANGE UP (ref 4.8–10.8)
WBC # FLD AUTO: 10.3 K/UL — SIGNIFICANT CHANGE UP (ref 4.8–10.8)

## 2019-02-21 PROCEDURE — 99284 EMERGENCY DEPT VISIT MOD MDM: CPT | Mod: 25

## 2019-02-21 PROCEDURE — 36415 COLL VENOUS BLD VENIPUNCTURE: CPT

## 2019-02-21 PROCEDURE — 96360 HYDRATION IV INFUSION INIT: CPT | Mod: XU

## 2019-02-21 PROCEDURE — 85027 COMPLETE CBC AUTOMATED: CPT

## 2019-02-21 PROCEDURE — 59025 FETAL NON-STRESS TEST: CPT

## 2019-02-21 PROCEDURE — 80053 COMPREHEN METABOLIC PANEL: CPT

## 2019-02-21 PROCEDURE — 93010 ELECTROCARDIOGRAM REPORT: CPT

## 2019-02-21 PROCEDURE — G0463: CPT

## 2019-02-21 PROCEDURE — 99284 EMERGENCY DEPT VISIT MOD MDM: CPT

## 2019-02-21 PROCEDURE — 71275 CT ANGIOGRAPHY CHEST: CPT

## 2019-02-21 PROCEDURE — 71275 CT ANGIOGRAPHY CHEST: CPT | Mod: 26

## 2019-02-21 PROCEDURE — 93005 ELECTROCARDIOGRAM TRACING: CPT

## 2019-02-21 RX ORDER — SODIUM CHLORIDE 9 MG/ML
1000 INJECTION INTRAMUSCULAR; INTRAVENOUS; SUBCUTANEOUS ONCE
Qty: 0 | Refills: 0 | Status: COMPLETED | OUTPATIENT
Start: 2019-02-21 | End: 2019-02-21

## 2019-02-21 RX ADMIN — SODIUM CHLORIDE 1000 MILLILITER(S): 9 INJECTION INTRAMUSCULAR; INTRAVENOUS; SUBCUTANEOUS at 12:45

## 2019-02-21 RX ADMIN — SODIUM CHLORIDE 2000 MILLILITER(S): 9 INJECTION INTRAMUSCULAR; INTRAVENOUS; SUBCUTANEOUS at 11:44

## 2019-02-21 NOTE — ED PROVIDER NOTE - NS ED ROS FT
No fever/chills, No photophobia/eye pain/changes in vision, No ear pain/sore throat/dysphagia, + chest pain no palpitations, + SOB no cough/wheeze/stridor, No abdominal pain, No N/V/D, no dysuria/frequency/discharge, No neck/back pain, no rash, no changes in neurological status/function.

## 2019-02-21 NOTE — ED PROVIDER NOTE - CLINICAL SUMMARY MEDICAL DECISION MAKING FREE TEXT BOX
labs and imaging reviewed. Pt with nonspecific chest pain and PE ruled out via CTA. Pt feeling better. no longer tachycardic. Case d/w OB resident and we will d/c patient from ER and sent patient to OB for fetal monitoring. Pt instructed to return for worsening cp, sob, or any other concerns.  Pt given a copy of all results and instructed to f/up with pcp regarding any abnormal results.

## 2019-02-21 NOTE — OB RN TRIAGE NOTE - PSH
Cervical cerclage suture present, antepartum  placed on 2018  H/O cone biopsy of cervix  ?  H/O:     History of eye surgery  Right eyebrown- 2000

## 2019-02-21 NOTE — ED STATDOCS - PROGRESS NOTE DETAILS
27 y/o F 7 months pregnant with PMHx , with PMHx preeclampsia pt presents to the ED c/o worsening CP beginning yesterday.  Pt states yesterday she began to feel L shoulder pain radiating to her L arm, and today began to feel chest tightness.  Pt states she has felt SOB throughout her current pregnancy, but notes mild worsening SOB, states she feels SOB while sitting in the stretcher currently.  She takes prenatal vitamins.  Pt called her OB about her symptoms and he advised pt come into the ED for further evaluation. OB: Dr. Nettles  Based on EKG and ascultation, pt is tachycardic, occasional PVC.  Pt will be sent to the Main ED for further treatment and evaluation. Initial orders placed.  Need to r/o PE.

## 2019-02-21 NOTE — ED ADULT NURSE NOTE - RESPIRATORY WDL
Breathing spontaneous and unlabored. Breath sounds clear and equal bilaterally with regular rhythm with shortness of breath

## 2019-02-21 NOTE — ED ADULT TRIAGE NOTE - CHIEF COMPLAINT QUOTE
Patient arrives ambulatory to ED, awake alert, and oriented times 3, breathing unlabored.  Patient complaining of left arm pain and numbness which started yesterday.  Patient also complaining of chest pain  Patient 7 months pregnant.  Told to come to ED by OB/GYN.  EKG done at triage

## 2019-02-21 NOTE — ED ADULT NURSE NOTE - OBJECTIVE STATEMENT
Pt is a 29 y/o female 7 mo pregnant with her 2nd child. She is in no acute distress, c/o constant chest pain/tightness 4/10 with radiating numbness to left arm and neck. States her arm pain started yesterday evening and this morning she started with the chest pain and was told by her OB/GYN to come to the ED. Pt vitals are stable as documented,

## 2019-02-21 NOTE — OB PROVIDER TRIAGE NOTE - NSOBPROVIDERNOTE_OBGYN_ALL_OB_FT
29yo  at 30w2d who presented for evaluation of chest tightness, shortness of breath, and pain of the left arm since yesterday. Cleared by ED, in stable condition.  - patient here for NST then will be discharged home.     d/w Dr. Nettles 29yo  at 30w2d who presented for evaluation of chest tightness, shortness of breath, and pain of the left arm since yesterday. Cleared by ED, in stable condition.  - NST reactive, Cat I tracing    Discharged home with labor precautions. Will follow up at next PNC visit in office.     d/w Dr. Nettles 1-2 drinks

## 2019-02-21 NOTE — OB PROVIDER TRIAGE NOTE - NSHPPHYSICALEXAM_GEN_ALL_CORE
Vital Signs Last 24 Hrs  T(C): 36.8 (21 Feb 2019 17:35), Max: 37.2 (21 Feb 2019 16:01)  T(F): 98.24 (21 Feb 2019 17:35), Max: 99 (21 Feb 2019 16:01)  HR: 100 (21 Feb 2019 17:35) (90 - 113)  BP: 117/69 (21 Feb 2019 17:35) (117/69 - 129/77)  BP(mean): 96 (21 Feb 2019 11:45) (96 - 96)  RR: 18 (21 Feb 2019 17:35) (18 - 18)  SpO2: 98% (21 Feb 2019 16:01) (98% - 99%)    FHT: baseline 140, moderate variability, +accels, no decels   [place]: no uterine activity

## 2019-02-21 NOTE — ED ADULT NURSE NOTE - NSIMPLEMENTINTERV_GEN_ALL_ED
Implemented All Universal Safety Interventions:  [place] to call system. Call bell, personal items and telephone within reach. Instruct patient to call for assistance. Room bathroom lighting operational. Non-slip footwear when patient is off stretcher. Physically safe environment: no spills, clutter or unnecessary equipment. Stretcher in lowest position, wheels locked, appropriate side rails in place.

## 2019-02-21 NOTE — ED PROVIDER NOTE - OBJECTIVE STATEMENT
Pt is a 27 yo F co L-sided chest pain.  Pt currently 31 wks pregnant. Pt states that this morning she started to have L-sided chest pain that went into the L shoulder and down the arm associated with sob. Pt states that she called her OB and was told to come to the ER for evaluation. Pt states that she still feels the baby moving and denies any bleeding, contractions or ob complaints.

## 2019-02-21 NOTE — OB PROVIDER TRIAGE NOTE - HISTORY OF PRESENT ILLNESS
29yo  at 30w2d who presented for evaluation of chest tightness, shortness of breath, and pain of the left arm since yesterday. The pain radiated from the left neck down, was constant, and was associated with  numbness and tingling. The patient went to the ED and reportedly had an EKG and chest CT with contrast, which were both unremarkable. She was cleared by the ED and transferred to L&D. She endorsed improvement of symptoms since coming to the hospital. She denied other complaints at this time, including vaginal bleeding, recent infections, or leakage of fluid.    PMH: "leaky heart valve"  Medications: PNV  Allergies: NKDA  PSH:  (), removal of malignant eyebrow mass (age 12)

## 2019-02-21 NOTE — OB RN TRIAGE NOTE - CHIEF COMPLAINT QUOTE
"I came into the ER for chest pain, numbing of left are and SOB. They did a cat scan and everything is normal"

## 2019-03-02 ENCOUNTER — TRANSCRIPTION ENCOUNTER (OUTPATIENT)
Age: 29
End: 2019-03-02

## 2019-03-05 ENCOUNTER — APPOINTMENT (OUTPATIENT)
Dept: OBGYN | Facility: CLINIC | Age: 29
End: 2019-03-05
Payer: MEDICAID

## 2019-03-05 ENCOUNTER — NON-APPOINTMENT (OUTPATIENT)
Age: 29
End: 2019-03-05

## 2019-03-05 VITALS
HEIGHT: 65 IN | BODY MASS INDEX: 29.16 KG/M2 | HEART RATE: 121 BPM | WEIGHT: 175 LBS | DIASTOLIC BLOOD PRESSURE: 87 MMHG | SYSTOLIC BLOOD PRESSURE: 138 MMHG

## 2019-03-05 DIAGNOSIS — Z3A.29 29 WEEKS GESTATION OF PREGNANCY: ICD-10-CM

## 2019-03-05 PROCEDURE — 0502F SUBSEQUENT PRENATAL CARE: CPT

## 2019-03-18 ENCOUNTER — APPOINTMENT (OUTPATIENT)
Dept: OBGYN | Facility: CLINIC | Age: 29
End: 2019-03-18
Payer: MEDICAID

## 2019-03-18 ENCOUNTER — NON-APPOINTMENT (OUTPATIENT)
Age: 29
End: 2019-03-18

## 2019-03-18 VITALS
BODY MASS INDEX: 29.38 KG/M2 | HEIGHT: 65 IN | SYSTOLIC BLOOD PRESSURE: 131 MMHG | HEART RATE: 108 BPM | WEIGHT: 176.38 LBS | DIASTOLIC BLOOD PRESSURE: 85 MMHG

## 2019-03-18 PROBLEM — Z3A.32 32 WEEKS GESTATION OF PREGNANCY: Status: RESOLVED | Noted: 2019-03-05 | Resolved: 2019-03-18

## 2019-03-18 PROCEDURE — 99213 OFFICE O/P EST LOW 20 MIN: CPT

## 2019-03-20 ENCOUNTER — ASOB RESULT (OUTPATIENT)
Age: 29
End: 2019-03-20

## 2019-03-20 ENCOUNTER — APPOINTMENT (OUTPATIENT)
Dept: ANTEPARTUM | Facility: CLINIC | Age: 29
End: 2019-03-20
Payer: MEDICAID

## 2019-03-20 DIAGNOSIS — Z3A.32 32 WEEKS GESTATION OF PREGNANCY: ICD-10-CM

## 2019-03-20 PROCEDURE — 76819 FETAL BIOPHYS PROFIL W/O NST: CPT

## 2019-03-20 PROCEDURE — 76816 OB US FOLLOW-UP PER FETUS: CPT

## 2019-03-22 DIAGNOSIS — Z3A.33 33 WEEKS GESTATION OF PREGNANCY: ICD-10-CM

## 2019-03-25 ENCOUNTER — CLINICAL ADVICE (OUTPATIENT)
Age: 29
End: 2019-03-25

## 2019-04-01 ENCOUNTER — OUTPATIENT (OUTPATIENT)
Dept: OUTPATIENT SERVICES | Facility: HOSPITAL | Age: 29
LOS: 1 days | End: 2019-04-01
Payer: MEDICAID

## 2019-04-01 ENCOUNTER — NON-APPOINTMENT (OUTPATIENT)
Age: 29
End: 2019-04-01

## 2019-04-01 ENCOUNTER — APPOINTMENT (OUTPATIENT)
Dept: OBGYN | Facility: CLINIC | Age: 29
End: 2019-04-01
Payer: MEDICAID

## 2019-04-01 VITALS
DIASTOLIC BLOOD PRESSURE: 84 MMHG | HEIGHT: 65 IN | BODY MASS INDEX: 30.01 KG/M2 | HEART RATE: 106 BPM | WEIGHT: 180.13 LBS | SYSTOLIC BLOOD PRESSURE: 133 MMHG

## 2019-04-01 DIAGNOSIS — O34.30 MATERNAL CARE FOR CERVICAL INCOMPETENCE, UNSPECIFIED TRIMESTER: Chronic | ICD-10-CM

## 2019-04-01 DIAGNOSIS — Z98.891 HISTORY OF UTERINE SCAR FROM PREVIOUS SURGERY: Chronic | ICD-10-CM

## 2019-04-01 DIAGNOSIS — Z98.890 OTHER SPECIFIED POSTPROCEDURAL STATES: Chronic | ICD-10-CM

## 2019-04-01 PROCEDURE — 0502F SUBSEQUENT PRENATAL CARE: CPT

## 2019-04-01 PROCEDURE — G9001: CPT

## 2019-04-02 LAB — HIV1+2 AB SPEC QL IA.RAPID: NONREACTIVE

## 2019-04-03 LAB
GP B STREP DNA SPEC QL NAA+PROBE: NORMAL
GP B STREP DNA SPEC QL NAA+PROBE: NOT DETECTED
SOURCE GBS: NORMAL

## 2019-04-08 ENCOUNTER — APPOINTMENT (OUTPATIENT)
Dept: OBGYN | Facility: CLINIC | Age: 29
End: 2019-04-08
Payer: MEDICAID

## 2019-04-08 ENCOUNTER — NON-APPOINTMENT (OUTPATIENT)
Age: 29
End: 2019-04-08

## 2019-04-08 VITALS
WEIGHT: 180 LBS | SYSTOLIC BLOOD PRESSURE: 125 MMHG | BODY MASS INDEX: 29.99 KG/M2 | HEART RATE: 106 BPM | HEIGHT: 65 IN | DIASTOLIC BLOOD PRESSURE: 80 MMHG

## 2019-04-08 PROCEDURE — 0502F SUBSEQUENT PRENATAL CARE: CPT

## 2019-04-09 DIAGNOSIS — Z3A.36 36 WEEKS GESTATION OF PREGNANCY: ICD-10-CM

## 2019-04-09 DIAGNOSIS — Z3A.35 35 WEEKS GESTATION OF PREGNANCY: ICD-10-CM

## 2019-04-15 ENCOUNTER — NON-APPOINTMENT (OUTPATIENT)
Age: 29
End: 2019-04-15

## 2019-04-15 ENCOUNTER — APPOINTMENT (OUTPATIENT)
Dept: OBGYN | Facility: CLINIC | Age: 29
End: 2019-04-15
Payer: MEDICAID

## 2019-04-15 ENCOUNTER — OUTPATIENT (OUTPATIENT)
Dept: OUTPATIENT SERVICES | Facility: HOSPITAL | Age: 29
LOS: 1 days | End: 2019-04-15
Payer: MEDICAID

## 2019-04-15 VITALS
HEIGHT: 64 IN | DIASTOLIC BLOOD PRESSURE: 58 MMHG | TEMPERATURE: 98 F | HEART RATE: 80 BPM | RESPIRATION RATE: 18 BRPM | SYSTOLIC BLOOD PRESSURE: 118 MMHG | WEIGHT: 154.32 LBS

## 2019-04-15 VITALS
HEART RATE: 131 BPM | DIASTOLIC BLOOD PRESSURE: 85 MMHG | HEIGHT: 65 IN | SYSTOLIC BLOOD PRESSURE: 126 MMHG | WEIGHT: 183 LBS | BODY MASS INDEX: 30.49 KG/M2

## 2019-04-15 DIAGNOSIS — Z98.891 HISTORY OF UTERINE SCAR FROM PREVIOUS SURGERY: Chronic | ICD-10-CM

## 2019-04-15 DIAGNOSIS — Z01.818 ENCOUNTER FOR OTHER PREPROCEDURAL EXAMINATION: ICD-10-CM

## 2019-04-15 DIAGNOSIS — Z98.890 OTHER SPECIFIED POSTPROCEDURAL STATES: Chronic | ICD-10-CM

## 2019-04-15 DIAGNOSIS — O34.30 MATERNAL CARE FOR CERVICAL INCOMPETENCE, UNSPECIFIED TRIMESTER: Chronic | ICD-10-CM

## 2019-04-15 DIAGNOSIS — O34.219 MATERNAL CARE FOR UNSPECIFIED TYPE SCAR FROM PREVIOUS CESAREAN DELIVERY: ICD-10-CM

## 2019-04-15 LAB
ANION GAP SERPL CALC-SCNC: 15 MMOL/L — SIGNIFICANT CHANGE UP (ref 5–17)
APPEARANCE UR: CLEAR — SIGNIFICANT CHANGE UP
BACTERIA # UR AUTO: ABNORMAL
BASOPHILS # BLD AUTO: 0 K/UL — SIGNIFICANT CHANGE UP (ref 0–0.2)
BASOPHILS NFR BLD AUTO: 0.1 % — SIGNIFICANT CHANGE UP (ref 0–2)
BILIRUB UR-MCNC: NEGATIVE — SIGNIFICANT CHANGE UP
BLD GP AB SCN SERPL QL: SIGNIFICANT CHANGE UP
BUN SERPL-MCNC: 9 MG/DL — SIGNIFICANT CHANGE UP (ref 8–20)
CALCIUM SERPL-MCNC: 8.7 MG/DL — SIGNIFICANT CHANGE UP (ref 8.6–10.2)
CHLORIDE SERPL-SCNC: 100 MMOL/L — SIGNIFICANT CHANGE UP (ref 98–107)
CO2 SERPL-SCNC: 22 MMOL/L — SIGNIFICANT CHANGE UP (ref 22–29)
COLOR SPEC: YELLOW — SIGNIFICANT CHANGE UP
CREAT SERPL-MCNC: 0.43 MG/DL — LOW (ref 0.5–1.3)
DIFF PNL FLD: NEGATIVE — SIGNIFICANT CHANGE UP
EOSINOPHIL # BLD AUTO: 0.1 K/UL — SIGNIFICANT CHANGE UP (ref 0–0.5)
EOSINOPHIL NFR BLD AUTO: 0.5 % — SIGNIFICANT CHANGE UP (ref 0–6)
EPI CELLS # UR: SIGNIFICANT CHANGE UP
GLUCOSE SERPL-MCNC: 81 MG/DL — SIGNIFICANT CHANGE UP (ref 70–115)
GLUCOSE UR QL: NEGATIVE MG/DL — SIGNIFICANT CHANGE UP
HCT VFR BLD CALC: 40 % — SIGNIFICANT CHANGE UP (ref 37–47)
HGB BLD-MCNC: 13.3 G/DL — SIGNIFICANT CHANGE UP (ref 12–16)
KETONES UR-MCNC: NEGATIVE — SIGNIFICANT CHANGE UP
LEUKOCYTE ESTERASE UR-ACNC: ABNORMAL
LYMPHOCYTES # BLD AUTO: 1.4 K/UL — SIGNIFICANT CHANGE UP (ref 1–4.8)
LYMPHOCYTES # BLD AUTO: 12 % — LOW (ref 20–55)
MCHC RBC-ENTMCNC: 29.3 PG — SIGNIFICANT CHANGE UP (ref 27–31)
MCHC RBC-ENTMCNC: 33.3 G/DL — SIGNIFICANT CHANGE UP (ref 32–36)
MCV RBC AUTO: 88.1 FL — SIGNIFICANT CHANGE UP (ref 81–99)
MONOCYTES # BLD AUTO: 1.2 K/UL — HIGH (ref 0–0.8)
MONOCYTES NFR BLD AUTO: 9.9 % — SIGNIFICANT CHANGE UP (ref 3–10)
NEUTROPHILS # BLD AUTO: 9.2 K/UL — HIGH (ref 1.8–8)
NEUTROPHILS NFR BLD AUTO: 76.7 % — HIGH (ref 37–73)
NITRITE UR-MCNC: NEGATIVE — SIGNIFICANT CHANGE UP
PH UR: 7 — SIGNIFICANT CHANGE UP (ref 5–8)
PLATELET # BLD AUTO: 205 K/UL — SIGNIFICANT CHANGE UP (ref 150–400)
POTASSIUM SERPL-MCNC: 3.9 MMOL/L — SIGNIFICANT CHANGE UP (ref 3.5–5.3)
POTASSIUM SERPL-SCNC: 3.9 MMOL/L — SIGNIFICANT CHANGE UP (ref 3.5–5.3)
PROT UR-MCNC: NEGATIVE MG/DL — SIGNIFICANT CHANGE UP
RBC # BLD: 4.54 M/UL — SIGNIFICANT CHANGE UP (ref 4.4–5.2)
RBC # FLD: 14.4 % — SIGNIFICANT CHANGE UP (ref 11–15.6)
RBC CASTS # UR COMP ASSIST: SIGNIFICANT CHANGE UP /HPF (ref 0–4)
SODIUM SERPL-SCNC: 137 MMOL/L — SIGNIFICANT CHANGE UP (ref 135–145)
SP GR SPEC: 1 — LOW (ref 1.01–1.02)
TYPE + AB SCN PNL BLD: SIGNIFICANT CHANGE UP
UROBILINOGEN FLD QL: NEGATIVE MG/DL — SIGNIFICANT CHANGE UP
WBC # BLD: 12 K/UL — HIGH (ref 4.8–10.8)
WBC # FLD AUTO: 12 K/UL — HIGH (ref 4.8–10.8)
WBC UR QL: SIGNIFICANT CHANGE UP

## 2019-04-15 PROCEDURE — 0502F SUBSEQUENT PRENATAL CARE: CPT

## 2019-04-15 PROCEDURE — 85027 COMPLETE CBC AUTOMATED: CPT

## 2019-04-15 PROCEDURE — 86850 RBC ANTIBODY SCREEN: CPT

## 2019-04-15 PROCEDURE — 86901 BLOOD TYPING SEROLOGIC RH(D): CPT

## 2019-04-15 PROCEDURE — 80048 BASIC METABOLIC PNL TOTAL CA: CPT

## 2019-04-15 PROCEDURE — 81001 URINALYSIS AUTO W/SCOPE: CPT

## 2019-04-15 PROCEDURE — 86900 BLOOD TYPING SEROLOGIC ABO: CPT

## 2019-04-15 PROCEDURE — 36415 COLL VENOUS BLD VENIPUNCTURE: CPT

## 2019-04-15 PROCEDURE — G0463: CPT

## 2019-04-19 DIAGNOSIS — Z3A.37 37 WEEKS GESTATION OF PREGNANCY: ICD-10-CM

## 2019-04-22 ENCOUNTER — NON-APPOINTMENT (OUTPATIENT)
Age: 29
End: 2019-04-22

## 2019-04-22 ENCOUNTER — APPOINTMENT (OUTPATIENT)
Dept: OBGYN | Facility: CLINIC | Age: 29
End: 2019-04-22
Payer: MEDICAID

## 2019-04-22 VITALS
SYSTOLIC BLOOD PRESSURE: 128 MMHG | DIASTOLIC BLOOD PRESSURE: 83 MMHG | BODY MASS INDEX: 31.04 KG/M2 | WEIGHT: 186.31 LBS | HEART RATE: 107 BPM | HEIGHT: 65 IN

## 2019-04-22 PROCEDURE — 0502F SUBSEQUENT PRENATAL CARE: CPT

## 2019-04-24 ENCOUNTER — TRANSCRIPTION ENCOUNTER (OUTPATIENT)
Age: 29
End: 2019-04-24

## 2019-04-25 ENCOUNTER — INPATIENT (INPATIENT)
Facility: HOSPITAL | Age: 29
LOS: 1 days | Discharge: HOME | End: 2019-04-27
Attending: OBSTETRICS & GYNECOLOGY | Admitting: OBSTETRICS & GYNECOLOGY
Payer: MEDICAID

## 2019-04-25 ENCOUNTER — RESULT REVIEW (OUTPATIENT)
Age: 29
End: 2019-04-25

## 2019-04-25 ENCOUNTER — TRANSCRIPTION ENCOUNTER (OUTPATIENT)
Age: 29
End: 2019-04-25

## 2019-04-25 VITALS
SYSTOLIC BLOOD PRESSURE: 136 MMHG | HEIGHT: 65 IN | WEIGHT: 186.07 LBS | DIASTOLIC BLOOD PRESSURE: 84 MMHG | HEART RATE: 90 BPM | TEMPERATURE: 98 F | RESPIRATION RATE: 15 BRPM

## 2019-04-25 DIAGNOSIS — Z98.890 OTHER SPECIFIED POSTPROCEDURAL STATES: Chronic | ICD-10-CM

## 2019-04-25 DIAGNOSIS — Z98.891 HISTORY OF UTERINE SCAR FROM PREVIOUS SURGERY: Chronic | ICD-10-CM

## 2019-04-25 DIAGNOSIS — O34.30 MATERNAL CARE FOR CERVICAL INCOMPETENCE, UNSPECIFIED TRIMESTER: Chronic | ICD-10-CM

## 2019-04-25 DIAGNOSIS — O34.219 MATERNAL CARE FOR UNSPECIFIED TYPE SCAR FROM PREVIOUS CESAREAN DELIVERY: ICD-10-CM

## 2019-04-25 LAB
BASOPHILS # BLD AUTO: 0 K/UL — SIGNIFICANT CHANGE UP (ref 0–0.2)
BASOPHILS NFR BLD AUTO: 0.2 % — SIGNIFICANT CHANGE UP (ref 0–2)
BLD GP AB SCN SERPL QL: SIGNIFICANT CHANGE UP
EOSINOPHIL # BLD AUTO: 0.1 K/UL — SIGNIFICANT CHANGE UP (ref 0–0.5)
EOSINOPHIL NFR BLD AUTO: 0.8 % — SIGNIFICANT CHANGE UP (ref 0–6)
HCT VFR BLD CALC: 39.6 % — SIGNIFICANT CHANGE UP (ref 37–47)
HGB BLD-MCNC: 13.2 G/DL — SIGNIFICANT CHANGE UP (ref 12–16)
LYMPHOCYTES # BLD AUTO: 1.8 K/UL — SIGNIFICANT CHANGE UP (ref 1–4.8)
LYMPHOCYTES # BLD AUTO: 19.4 % — LOW (ref 20–55)
MCHC RBC-ENTMCNC: 29.6 PG — SIGNIFICANT CHANGE UP (ref 27–31)
MCHC RBC-ENTMCNC: 33.3 G/DL — SIGNIFICANT CHANGE UP (ref 32–36)
MCV RBC AUTO: 88.8 FL — SIGNIFICANT CHANGE UP (ref 81–99)
MONOCYTES # BLD AUTO: 1 K/UL — HIGH (ref 0–0.8)
MONOCYTES NFR BLD AUTO: 11 % — HIGH (ref 3–10)
NEUTROPHILS # BLD AUTO: 6.4 K/UL — SIGNIFICANT CHANGE UP (ref 1.8–8)
NEUTROPHILS NFR BLD AUTO: 68.1 % — SIGNIFICANT CHANGE UP (ref 37–73)
PLATELET # BLD AUTO: 210 K/UL — SIGNIFICANT CHANGE UP (ref 150–400)
RBC # BLD: 4.46 M/UL — SIGNIFICANT CHANGE UP (ref 4.4–5.2)
RBC # FLD: 14.6 % — SIGNIFICANT CHANGE UP (ref 11–15.6)
T PALLIDUM AB TITR SER: NEGATIVE — SIGNIFICANT CHANGE UP
TYPE + AB SCN PNL BLD: SIGNIFICANT CHANGE UP
WBC # BLD: 9.4 K/UL — SIGNIFICANT CHANGE UP (ref 4.8–10.8)
WBC # FLD AUTO: 9.4 K/UL — SIGNIFICANT CHANGE UP (ref 4.8–10.8)

## 2019-04-25 PROCEDURE — 59510 CESAREAN DELIVERY: CPT | Mod: U9

## 2019-04-25 PROCEDURE — 59514 CESAREAN DELIVERY ONLY: CPT | Mod: 80,U9

## 2019-04-25 PROCEDURE — 88304 TISSUE EXAM BY PATHOLOGIST: CPT | Mod: 26

## 2019-04-25 RX ORDER — OXYCODONE AND ACETAMINOPHEN 5; 325 MG/1; MG/1
1 TABLET ORAL
Qty: 0 | Refills: 0 | Status: DISCONTINUED | OUTPATIENT
Start: 2019-04-25 | End: 2019-04-27

## 2019-04-25 RX ORDER — SODIUM CHLORIDE 9 MG/ML
1000 INJECTION, SOLUTION INTRAVENOUS
Qty: 0 | Refills: 0 | Status: DISCONTINUED | OUTPATIENT
Start: 2019-04-25 | End: 2019-04-25

## 2019-04-25 RX ORDER — SODIUM CHLORIDE 9 MG/ML
1000 INJECTION, SOLUTION INTRAVENOUS
Qty: 0 | Refills: 0 | Status: DISCONTINUED | OUTPATIENT
Start: 2019-04-25 | End: 2019-04-27

## 2019-04-25 RX ORDER — ONDANSETRON 8 MG/1
4 TABLET, FILM COATED ORAL EVERY 6 HOURS
Qty: 0 | Refills: 0 | Status: DISCONTINUED | OUTPATIENT
Start: 2019-04-25 | End: 2019-04-27

## 2019-04-25 RX ORDER — CEFAZOLIN SODIUM 1 G
2000 VIAL (EA) INJECTION ONCE
Qty: 0 | Refills: 0 | Status: DISCONTINUED | OUTPATIENT
Start: 2019-04-25 | End: 2019-04-25

## 2019-04-25 RX ORDER — DIPHENHYDRAMINE HCL 50 MG
25 CAPSULE ORAL EVERY 4 HOURS
Qty: 0 | Refills: 0 | Status: DISCONTINUED | OUTPATIENT
Start: 2019-04-25 | End: 2019-04-27

## 2019-04-25 RX ORDER — ONDANSETRON 8 MG/1
4 TABLET, FILM COATED ORAL EVERY 6 HOURS
Qty: 0 | Refills: 0 | Status: DISCONTINUED | OUTPATIENT
Start: 2019-04-25 | End: 2019-04-25

## 2019-04-25 RX ORDER — ENOXAPARIN SODIUM 100 MG/ML
40 INJECTION SUBCUTANEOUS EVERY 24 HOURS
Qty: 0 | Refills: 0 | Status: DISCONTINUED | OUTPATIENT
Start: 2019-04-25 | End: 2019-04-27

## 2019-04-25 RX ORDER — NALBUPHINE HYDROCHLORIDE 10 MG/ML
5 INJECTION, SOLUTION INTRAMUSCULAR; INTRAVENOUS; SUBCUTANEOUS EVERY 6 HOURS
Qty: 0 | Refills: 0 | Status: DISCONTINUED | OUTPATIENT
Start: 2019-04-25 | End: 2019-04-27

## 2019-04-25 RX ORDER — OXYTOCIN 10 UNIT/ML
41.67 VIAL (ML) INJECTION
Qty: 20 | Refills: 0 | Status: DISCONTINUED | OUTPATIENT
Start: 2019-04-25 | End: 2019-04-25

## 2019-04-25 RX ORDER — LANOLIN
1 OINTMENT (GRAM) TOPICAL
Qty: 0 | Refills: 0 | Status: DISCONTINUED | OUTPATIENT
Start: 2019-04-25 | End: 2019-04-27

## 2019-04-25 RX ORDER — SODIUM CHLORIDE 9 MG/ML
1000 INJECTION, SOLUTION INTRAVENOUS ONCE
Qty: 0 | Refills: 0 | Status: COMPLETED | OUTPATIENT
Start: 2019-04-25 | End: 2019-04-25

## 2019-04-25 RX ORDER — DIPHENHYDRAMINE HCL 50 MG
25 CAPSULE ORAL EVERY 6 HOURS
Qty: 0 | Refills: 0 | Status: DISCONTINUED | OUTPATIENT
Start: 2019-04-25 | End: 2019-04-27

## 2019-04-25 RX ORDER — KETOROLAC TROMETHAMINE 30 MG/ML
30 SYRINGE (ML) INJECTION ONCE
Qty: 0 | Refills: 0 | Status: DISCONTINUED | OUTPATIENT
Start: 2019-04-25 | End: 2019-04-25

## 2019-04-25 RX ORDER — OXYTOCIN 10 UNIT/ML
41.67 VIAL (ML) INJECTION
Qty: 20 | Refills: 0 | Status: DISCONTINUED | OUTPATIENT
Start: 2019-04-25 | End: 2019-04-27

## 2019-04-25 RX ORDER — ACETAMINOPHEN 500 MG
1000 TABLET ORAL ONCE
Qty: 0 | Refills: 0 | Status: DISCONTINUED | OUTPATIENT
Start: 2019-04-25 | End: 2019-04-27

## 2019-04-25 RX ORDER — OXYTOCIN 10 UNIT/ML
333.33 VIAL (ML) INJECTION
Qty: 20 | Refills: 0 | Status: DISCONTINUED | OUTPATIENT
Start: 2019-04-25 | End: 2019-04-27

## 2019-04-25 RX ORDER — CITRIC ACID/SODIUM CITRATE 300-500 MG
30 SOLUTION, ORAL ORAL ONCE
Qty: 0 | Refills: 0 | Status: COMPLETED | OUTPATIENT
Start: 2019-04-25 | End: 2019-04-25

## 2019-04-25 RX ORDER — NALOXONE HYDROCHLORIDE 4 MG/.1ML
0.1 SPRAY NASAL
Qty: 0 | Refills: 0 | Status: DISCONTINUED | OUTPATIENT
Start: 2019-04-25 | End: 2019-04-27

## 2019-04-25 RX ORDER — IBUPROFEN 200 MG
600 TABLET ORAL EVERY 6 HOURS
Qty: 0 | Refills: 0 | Status: DISCONTINUED | OUTPATIENT
Start: 2019-04-25 | End: 2019-04-27

## 2019-04-25 RX ORDER — OXYCODONE AND ACETAMINOPHEN 5; 325 MG/1; MG/1
2 TABLET ORAL EVERY 6 HOURS
Qty: 0 | Refills: 0 | Status: DISCONTINUED | OUTPATIENT
Start: 2019-04-25 | End: 2019-04-27

## 2019-04-25 RX ORDER — GLYCERIN ADULT
1 SUPPOSITORY, RECTAL RECTAL AT BEDTIME
Qty: 0 | Refills: 0 | Status: DISCONTINUED | OUTPATIENT
Start: 2019-04-25 | End: 2019-04-27

## 2019-04-25 RX ORDER — FERROUS SULFATE 325(65) MG
325 TABLET ORAL DAILY
Qty: 0 | Refills: 0 | Status: DISCONTINUED | OUTPATIENT
Start: 2019-04-25 | End: 2019-04-27

## 2019-04-25 RX ORDER — METOCLOPRAMIDE HCL 10 MG
10 TABLET ORAL ONCE
Qty: 0 | Refills: 0 | Status: DISCONTINUED | OUTPATIENT
Start: 2019-04-25 | End: 2019-04-25

## 2019-04-25 RX ORDER — TETANUS TOXOID, REDUCED DIPHTHERIA TOXOID AND ACELLULAR PERTUSSIS VACCINE, ADSORBED 5; 2.5; 8; 8; 2.5 [IU]/.5ML; [IU]/.5ML; UG/.5ML; UG/.5ML; UG/.5ML
0.5 SUSPENSION INTRAMUSCULAR ONCE
Qty: 0 | Refills: 0 | Status: DISCONTINUED | OUTPATIENT
Start: 2019-04-25 | End: 2019-04-27

## 2019-04-25 RX ORDER — SIMETHICONE 80 MG/1
80 TABLET, CHEWABLE ORAL EVERY 4 HOURS
Qty: 0 | Refills: 0 | Status: DISCONTINUED | OUTPATIENT
Start: 2019-04-25 | End: 2019-04-27

## 2019-04-25 RX ORDER — DOCUSATE SODIUM 100 MG
100 CAPSULE ORAL
Qty: 0 | Refills: 0 | Status: DISCONTINUED | OUTPATIENT
Start: 2019-04-25 | End: 2019-04-27

## 2019-04-25 RX ORDER — KETOROLAC TROMETHAMINE 30 MG/ML
30 SYRINGE (ML) INJECTION EVERY 6 HOURS
Qty: 0 | Refills: 0 | Status: DISCONTINUED | OUTPATIENT
Start: 2019-04-25 | End: 2019-04-25

## 2019-04-25 RX ADMIN — ENOXAPARIN SODIUM 40 MILLIGRAM(S): 100 INJECTION SUBCUTANEOUS at 21:41

## 2019-04-25 RX ADMIN — Medication 100 MILLIGRAM(S): at 07:48

## 2019-04-25 RX ADMIN — SODIUM CHLORIDE 125 MILLILITER(S): 9 INJECTION, SOLUTION INTRAVENOUS at 06:54

## 2019-04-25 RX ADMIN — Medication 30 MILLIGRAM(S): at 21:41

## 2019-04-25 RX ADMIN — Medication 1000 MILLIUNIT(S)/MIN: at 08:49

## 2019-04-25 RX ADMIN — SODIUM CHLORIDE 125 MILLILITER(S): 9 INJECTION, SOLUTION INTRAVENOUS at 07:32

## 2019-04-25 RX ADMIN — SODIUM CHLORIDE 125 MILLILITER(S): 9 INJECTION, SOLUTION INTRAVENOUS at 15:53

## 2019-04-25 RX ADMIN — Medication 30 MILLIGRAM(S): at 16:09

## 2019-04-25 RX ADMIN — Medication 30 MILLIGRAM(S): at 21:55

## 2019-04-25 RX ADMIN — Medication 30 MILLIGRAM(S): at 15:54

## 2019-04-25 RX ADMIN — Medication 30 MILLILITER(S): at 07:31

## 2019-04-25 RX ADMIN — SODIUM CHLORIDE 2000 MILLILITER(S): 9 INJECTION, SOLUTION INTRAVENOUS at 06:35

## 2019-04-25 NOTE — OB NEONATOLOGY/PEDIATRICIAN DELIVERY SUMMARY - NSPEDSNEONOTESA_OBGYN_ALL_OB_FT
Requested b Dr Perkins to attend a scheduled RC/S of a 29 y/o  mom at 39.2 weeks GA.  She had + PNC, is O pos, HBSAg neg, HIV neg, RPR NR, Rubella immune, GBS neg.  L&D:  AROM at delivery.  Baby born vertex with good cry, transferred to warmer, orally suctioned, dried, and examined.  Baby showed to father.  Will transition to Regular Nursery under PMD care.

## 2019-04-25 NOTE — DISCHARGE NOTE OB - MEDICATION SUMMARY - MEDICATIONS TO TAKE
I will START or STAY ON the medications listed below when I get home from the hospital:    Percocet 5/325 oral tablet  -- 1 tab(s) by mouth every 6 hours, As Needed -Moderate Pain (4 - 6) MDD:4  -- Indication: For severe pain    ibuprofen 600 mg oral tablet  -- 1 tab(s) by mouth every 6 hours, As needed, Mild Pain (1 - 3) MDD:4  -- Indication: For Mild to moderate pain

## 2019-04-25 NOTE — OB PROVIDER H&P - NS_FHRLATEDECEL_ALL_ALL_OB
Case discussed with [facility]. Pt is recommended to see Dr Carlota Navarrete, lung specialist at clinic for eval.  Will fax current available information to fax [phone]. Clinic is to call me back. this am with further info.      Pt was placed on Less than or equal to 2 late decelerations in 30 minutes

## 2019-04-25 NOTE — DISCHARGE NOTE OB - CARE PLAN
Principal Discharge DX:	 delivery, delivered, current hospitalization  Goal:	rapid recovery  Assessment and plan of treatment:	Assessment and Plan of Treatment: Please call your provider to schedule postoperative wound check visit in 1-2 weeks. Take medications as directed, regular diet, activity as tolerated. Exclusive breast feeding for the first 6 months is recommended. Nothing per vagina for 6 weeks (incl. sex, douching, etc). If you have additional concerns, please inform your provider.

## 2019-04-25 NOTE — OB RN DELIVERY SUMMARY - NS_SKINCOMMENTSA_OBGYN_ALL_OB_FT
mother having cerclage removal after c/section, fob to RR was to continue sts, however became nauseous.

## 2019-04-25 NOTE — DISCHARGE NOTE OB - PLAN OF CARE
rapid recovery Assessment and Plan of Treatment: Please call your provider to schedule postoperative wound check visit in 1-2 weeks. Take medications as directed, regular diet, activity as tolerated. Exclusive breast feeding for the first 6 months is recommended. Nothing per vagina for 6 weeks (incl. sex, douching, etc). If you have additional concerns, please inform your provider.

## 2019-04-25 NOTE — BRIEF OPERATIVE NOTE - NSICDXBRIEFPROCEDURE_GEN_ALL_CORE_FT
PROCEDURES:   section with removal of cerclage suture 2019 08:50:32  Gt Nettles  Repeat  section 2019 08:50:11  Gt Nettles

## 2019-04-25 NOTE — OB PROVIDER H&P - NSHPREVIEWOFSYSTEMS_GEN_ALL_CORE
Const: Denies fever, chills, malaise, fatigue, night sweats  HEENT: Denies changes in vision, sore throat  Neck: Denies neck pain/stiffness  Resp: Denies coughing, sneezing, SOB  Cardiovascular: Denies CP, palpitations, LE edema  GI: Denies nausea, vomiting, abdominal pain, diarrhea, constipation, blood in stool  : Denies urinary frequency/urgency/dysuria, hematuria  MSK: Denies back pain  Neuro: Denies HA, dizziness, numbness, focal weaknesses, LOC  Skin: Denies rashes

## 2019-04-25 NOTE — OB PROVIDER H&P - HISTORY OF PRESENT ILLNESS
29 y/o F  @ 39wks 2 ds gestation comes for a planned  to L&D. Had previous  in first pregnancy due to nonreasurring FHR and preeclampsia. Present pregnancy complicated by Cervical cerclage on 2018. Denies GDM, HTN, water breakage, bleeding. Attest of normal fetal movement. Last PO intake 22:00 yesterday.   Patient underwent CT angio evaluation on 2019 to rule out PE. Studies where negative at that time.     Allergies: Shellfish  Meds: Prenatal Vitamins  PMHx: Leaking cardiac valve (Aorta), Pre-eclampsia  SxHx: Cervical Cerclage,  x1

## 2019-04-25 NOTE — OB PROVIDER H&P - NSHPLABSRESULTS_GEN_ALL_CORE
CBC Full  -  ( 25 Apr 2019 06:43 )  WBC Count : 9.4 K/uL  RBC Count : 4.46 M/uL  Hemoglobin : 13.2 g/dL  Hematocrit : 39.6 %  Platelet Count - Automated : 210 K/uL  Mean Cell Volume : 88.8 fl  Mean Cell Hemoglobin : 29.6 pg  Mean Cell Hemoglobin Concentration : 33.3 g/dL  Auto Neutrophil # : 6.4 K/uL  Auto Lymphocyte # : 1.8 K/uL  Auto Monocyte # : 1.0 K/uL  Auto Eosinophil # : 0.1 K/uL  Auto Basophil # : 0.0 K/uL  Auto Neutrophil % : 68.1 %  Auto Lymphocyte % : 19.4 %  Auto Monocyte % : 11.0 %  Auto Eosinophil % : 0.8 %  Auto Basophil % : 0.2 %    Type + Screen (04.15.19 @ 12:48)    Type + Screen: O POS

## 2019-04-25 NOTE — DISCHARGE NOTE OB - HOSPITAL COURSE
Pt is a  delivered via repeat  section. She was transferred to postpartum unit without complications during her stay. Upon discharge she is voiding, tolerating PO, ambulating, and pain is controlled.

## 2019-04-25 NOTE — DISCHARGE NOTE OB - PATIENT PORTAL LINK FT
"Speech Language Therapy Clinical Swallow Evaluation completed.  Functional Status: Clinical swallow evaluation completed on this date.  Patient appeared confused and repeatedly stated, \"This is different.  What is this for?\"   He followed directives to complete the oral [facility] exam which was within normal limits.  Presentation of PO included ice chips, mixed consistencies, dry solids, and thin liquids.  The patient presented within functional limits for swallowing function.  He had adequate mastication and bolus manipulation, timely initiation of swallow trigger and adequate laryngeal elevation upon palpation.  He maintained clear vocal quality throughout the session and had no overt s/sx of aspiration with any consistency consumed.  The patient did report vision deficits which inhibited his ability to adequately cut his food.  At this time, recommend the patient continue regular/thins diet but cut food into bite size pieces 2/2 visual deficits.  No further acute SLP needs for dysphagia.  The patient did appear confused at times.  He may benefit from a cognitive-linguistic evaluation.    Recommendations - Diet: Diet / Liquid Recommendation: Thin Liquid, Regular                          Strategies: Head of Bed at 90 Degrees                          Medication Administration: Medication Administration : Whole with Liquid Wash  Plan of Care: Patient with no further skilled SLP needs in the acute care setting at this time  Post-Acute Therapy: Currently anticipate no further skilled therapy needs for dysphagia once patient is discharged from the inpatient setting.     See \"Rehab Therapy-Acute\" Patient Summary Report for complete documentation.   " You can access the [facility] Patient Portal, offered by [facility], by registering with the following website: [email or web address]

## 2019-04-26 DIAGNOSIS — Z34.83 ENCOUNTER FOR SUPERVISION OF OTHER NORMAL PREGNANCY, THIRD TRIMESTER: ICD-10-CM

## 2019-04-26 DIAGNOSIS — Z3A.38 38 WEEKS GESTATION OF PREGNANCY: ICD-10-CM

## 2019-04-26 LAB
BASOPHILS # BLD AUTO: 0 K/UL — SIGNIFICANT CHANGE UP (ref 0–0.2)
BASOPHILS NFR BLD AUTO: 0.1 % — SIGNIFICANT CHANGE UP (ref 0–2)
EOSINOPHIL # BLD AUTO: 0.1 K/UL — SIGNIFICANT CHANGE UP (ref 0–0.5)
EOSINOPHIL NFR BLD AUTO: 0.5 % — SIGNIFICANT CHANGE UP (ref 0–6)
HCT VFR BLD CALC: 34.6 % — LOW (ref 37–47)
HGB BLD-MCNC: 11.2 G/DL — LOW (ref 12–16)
LYMPHOCYTES # BLD AUTO: 1.4 K/UL — SIGNIFICANT CHANGE UP (ref 1–4.8)
LYMPHOCYTES # BLD AUTO: 10.7 % — LOW (ref 20–55)
MCHC RBC-ENTMCNC: 28.8 PG — SIGNIFICANT CHANGE UP (ref 27–31)
MCHC RBC-ENTMCNC: 32.4 G/DL — SIGNIFICANT CHANGE UP (ref 32–36)
MCV RBC AUTO: 88.9 FL — SIGNIFICANT CHANGE UP (ref 81–99)
MONOCYTES # BLD AUTO: 0.9 K/UL — HIGH (ref 0–0.8)
MONOCYTES NFR BLD AUTO: 6.7 % — SIGNIFICANT CHANGE UP (ref 3–10)
NEUTROPHILS # BLD AUTO: 10.7 K/UL — HIGH (ref 1.8–8)
NEUTROPHILS NFR BLD AUTO: 81.7 % — HIGH (ref 37–73)
PLATELET # BLD AUTO: 175 K/UL — SIGNIFICANT CHANGE UP (ref 150–400)
RBC # BLD: 3.89 M/UL — LOW (ref 4.4–5.2)
RBC # FLD: 14.7 % — SIGNIFICANT CHANGE UP (ref 11–15.6)
SURGICAL PATHOLOGY STUDY: SIGNIFICANT CHANGE UP
WBC # BLD: 13.1 K/UL — HIGH (ref 4.8–10.8)
WBC # FLD AUTO: 13.1 K/UL — HIGH (ref 4.8–10.8)

## 2019-04-26 RX ORDER — ACETAMINOPHEN 500 MG
650 TABLET ORAL ONCE
Qty: 0 | Refills: 0 | Status: COMPLETED | OUTPATIENT
Start: 2019-04-26 | End: 2019-04-26

## 2019-04-26 RX ADMIN — ENOXAPARIN SODIUM 40 MILLIGRAM(S): 100 INJECTION SUBCUTANEOUS at 21:28

## 2019-04-26 RX ADMIN — Medication 650 MILLIGRAM(S): at 22:07

## 2019-04-26 RX ADMIN — Medication 1 TABLET(S): at 13:00

## 2019-04-26 RX ADMIN — Medication 100 MILLIGRAM(S): at 13:00

## 2019-04-26 RX ADMIN — Medication 600 MILLIGRAM(S): at 06:29

## 2019-04-26 RX ADMIN — Medication 325 MILLIGRAM(S): at 18:34

## 2019-04-26 RX ADMIN — Medication 600 MILLIGRAM(S): at 07:20

## 2019-04-26 RX ADMIN — Medication 600 MILLIGRAM(S): at 14:00

## 2019-04-26 RX ADMIN — Medication 600 MILLIGRAM(S): at 18:34

## 2019-04-26 RX ADMIN — Medication 650 MILLIGRAM(S): at 21:27

## 2019-04-26 RX ADMIN — Medication 600 MILLIGRAM(S): at 13:00

## 2019-04-26 RX ADMIN — SIMETHICONE 80 MILLIGRAM(S): 80 TABLET, CHEWABLE ORAL at 13:00

## 2019-04-26 NOTE — PROGRESS NOTE ADULT - SUBJECTIVE AND OBJECTIVE BOX
28y year old  now POD#1 s/p  section at 39wks gestation.     No acute overnight events. Pain well controlled.  Patient is ambulating, +voiding, +Flatus, -BM  Reports minimal lochia.   +breast feeding, -breast tenderness    VS:   Vital Signs Last 24 Hrs  T(C): 36.8 (2019 04:51), Max: 37.1 (2019 00:35)  T(F): 98.3 (2019 04:51), Max: 98.8 (2019 00:35)  HR: 91 (2019 04:51) (67 - 91)  BP: 99/60 (2019 04:51) (99/60 - 118/70)  BP(mean): --  RR: 18 (2019 04:51) (12 - 25)  SpO2: 96% (2019 04:51) (96% - 100%)    Physical Exam:  General: NAD  Abdomen: soft, ND, firm fundus palpated at the umbilicus. Dressing removed. Incision clean, dry, and intact.  Ext: nontender lower extremities, bilaterally.     Labs: AM CBC pending

## 2019-04-27 VITALS
TEMPERATURE: 99 F | HEART RATE: 78 BPM | RESPIRATION RATE: 18 BRPM | DIASTOLIC BLOOD PRESSURE: 74 MMHG | SYSTOLIC BLOOD PRESSURE: 111 MMHG

## 2019-04-27 PROCEDURE — 36415 COLL VENOUS BLD VENIPUNCTURE: CPT

## 2019-04-27 PROCEDURE — 86850 RBC ANTIBODY SCREEN: CPT

## 2019-04-27 PROCEDURE — 86780 TREPONEMA PALLIDUM: CPT

## 2019-04-27 PROCEDURE — 59025 FETAL NON-STRESS TEST: CPT

## 2019-04-27 PROCEDURE — 59050 FETAL MONITOR W/REPORT: CPT

## 2019-04-27 PROCEDURE — 86900 BLOOD TYPING SEROLOGIC ABO: CPT

## 2019-04-27 PROCEDURE — 88304 TISSUE EXAM BY PATHOLOGIST: CPT

## 2019-04-27 PROCEDURE — 86901 BLOOD TYPING SEROLOGIC RH(D): CPT

## 2019-04-27 PROCEDURE — 76815 OB US LIMITED FETUS(S): CPT

## 2019-04-27 PROCEDURE — 85027 COMPLETE CBC AUTOMATED: CPT

## 2019-04-27 RX ORDER — IBUPROFEN 200 MG
1 TABLET ORAL
Qty: 45 | Refills: 0 | OUTPATIENT
Start: 2019-04-27

## 2019-04-27 RX ADMIN — Medication 100 MILLIGRAM(S): at 11:36

## 2019-04-27 RX ADMIN — Medication 325 MILLIGRAM(S): at 11:36

## 2019-04-27 RX ADMIN — Medication 600 MILLIGRAM(S): at 03:27

## 2019-04-27 RX ADMIN — Medication 600 MILLIGRAM(S): at 12:35

## 2019-04-27 RX ADMIN — SIMETHICONE 80 MILLIGRAM(S): 80 TABLET, CHEWABLE ORAL at 11:36

## 2019-04-27 RX ADMIN — Medication 600 MILLIGRAM(S): at 11:36

## 2019-04-27 RX ADMIN — Medication 1 TABLET(S): at 11:40

## 2019-04-27 NOTE — PROGRESS NOTE ADULT - ASSESSMENT
Patient is a 29yo  s/p repeat  and cerclage removal post-operative day 2.  Patient is feeling well overall.

## 2019-04-27 NOTE — PROGRESS NOTE ADULT - SUBJECTIVE AND OBJECTIVE BOX
Postpartum Note,  Section  Patient is a 27yo  s/p repeat  and cerclage removal post-operative day 2.    Subjective:  No acute events overnight. The patient is feeling well.   She is tolerating a diet and denies N/V.    Patient is having normal postpartum bleeding which is decreasing in amount.    She is breastfeeding and bottle feeding the baby.    Urinating appropriately.   -BM/+flatus.    Physical exam:    Vital Signs Last 24 Hrs  T(C): 36.9 (2019 20:00), Max: 37 (2019 08:26)  T(F): 98.4 (2019 20:00), Max: 98.6 (2019 08:26)  HR: 94 (2019 20:00) (94 - 103)  BP: 100/67 (2019 20:00) (97/62 - 100/67)  RR: 18 (2019 20:00) (18 - 18)    Heart: RRR  Lungs: CTABL  Breast: non tender, not engorged   Abdomen: Soft, nontender, no distension, firm uterine fundus, the incision is clean dry and intact with staples, +BS  Ext: No DVT signs, warm extremities    LABS:                        11.2   13.1  )-----------( 175      ( 2019 07:21 )             34.6

## 2019-04-27 NOTE — PROGRESS NOTE ADULT - PROBLEM SELECTOR PLAN 1
Continue the current pain medication.   Encourage ambulation and regular diet.   Encourage incentive spirometry.  Promote mother baby bonding.   : voiding spontaneously.  GI: +flatus, stool softeners PRN.  Continue DVT ppx: SCDs, ambulating, lovenox.   Tentative d/c plans for POD# 3-4.

## 2019-04-29 DIAGNOSIS — Z71.89 OTHER SPECIFIED COUNSELING: ICD-10-CM

## 2019-05-02 ENCOUNTER — APPOINTMENT (OUTPATIENT)
Dept: OBGYN | Facility: CLINIC | Age: 29
End: 2019-05-02
Payer: MEDICAID

## 2019-05-02 VITALS
HEART RATE: 124 BPM | SYSTOLIC BLOOD PRESSURE: 119 MMHG | HEIGHT: 65 IN | BODY MASS INDEX: 27.32 KG/M2 | WEIGHT: 164 LBS | DIASTOLIC BLOOD PRESSURE: 77 MMHG

## 2019-05-02 DIAGNOSIS — Z09 ENCOUNTER FOR FOLLOW-UP EXAMINATION AFTER COMPLETED TREATMENT FOR CONDITIONS OTHER THAN MALIGNANT NEOPLASM: ICD-10-CM

## 2019-05-02 PROBLEM — I38 ENDOCARDITIS, VALVE UNSPECIFIED: Chronic | Status: ACTIVE | Noted: 2019-04-25

## 2019-05-02 PROCEDURE — 99213 OFFICE O/P EST LOW 20 MIN: CPT

## 2019-05-02 PROCEDURE — 0503F POSTPARTUM CARE VISIT: CPT

## 2019-05-02 NOTE — COUNSELING
[Exercise] : exercise [Nutrition] : nutrition [Breast Self Exam] : breast self exam [Vitamins/Supplements] : vitamins/supplements [STD (testing, results, tx)] : STD (testing, results, tx)

## 2019-05-31 ENCOUNTER — APPOINTMENT (OUTPATIENT)
Dept: OBGYN | Facility: CLINIC | Age: 29
End: 2019-05-31

## 2019-06-06 ENCOUNTER — APPOINTMENT (OUTPATIENT)
Dept: OBGYN | Facility: CLINIC | Age: 29
End: 2019-06-06
Payer: MEDICAID

## 2019-06-06 VITALS
WEIGHT: 161 LBS | HEIGHT: 65 IN | SYSTOLIC BLOOD PRESSURE: 122 MMHG | HEART RATE: 78 BPM | BODY MASS INDEX: 26.82 KG/M2 | DIASTOLIC BLOOD PRESSURE: 85 MMHG

## 2019-06-06 DIAGNOSIS — Z30.09 ENCOUNTER FOR OTHER GENERAL COUNSELING AND ADVICE ON CONTRACEPTION: ICD-10-CM

## 2019-06-06 PROCEDURE — 99214 OFFICE O/P EST MOD 30 MIN: CPT

## 2019-06-06 PROCEDURE — 0503F POSTPARTUM CARE VISIT: CPT

## 2019-06-06 NOTE — COUNSELING
[Nutrition] : nutrition [Breast Self Exam] : breast self exam [Vitamins/Supplements] : vitamins/supplements [Exercise] : exercise [STD (testing, results, tx)] : STD (testing, results, tx) [Safe Sexual Practices] : safe sexual practices

## 2019-06-06 NOTE — PHYSICAL EXAM
[Awake] : awake [Alert] : alert [Acute Distress] : no acute distress [Mass] : no breast mass [Nipple Discharge] : no nipple discharge [Axillary LAD] : no axillary lymphadenopathy [Oriented x3] : oriented to person, place, and time [Soft] : soft [Tender] : non tender [Normal] : uterus [No Bleeding] : there was no active vaginal bleeding [Uterine Adnexae] : were not tender and not enlarged

## 2019-06-07 LAB
C TRACH RRNA SPEC QL NAA+PROBE: NOT DETECTED
HPV HIGH+LOW RISK DNA PNL CVX: NOT DETECTED
N GONORRHOEA RRNA SPEC QL NAA+PROBE: NOT DETECTED
SOURCE TP AMPLIFICATION: NORMAL

## 2019-06-12 LAB — CYTOLOGY CVX/VAG DOC THIN PREP: NORMAL

## 2019-07-02 ENCOUNTER — APPOINTMENT (OUTPATIENT)
Dept: OBGYN | Facility: CLINIC | Age: 29
End: 2019-07-02
Payer: MEDICAID

## 2019-07-02 VITALS
HEIGHT: 65 IN | SYSTOLIC BLOOD PRESSURE: 121 MMHG | DIASTOLIC BLOOD PRESSURE: 83 MMHG | BODY MASS INDEX: 27.32 KG/M2 | WEIGHT: 164 LBS | HEART RATE: 86 BPM

## 2019-07-02 DIAGNOSIS — Z30.430 ENCOUNTER FOR INSERTION OF INTRAUTERINE CONTRACEPTIVE DEVICE: ICD-10-CM

## 2019-07-02 LAB
HCG UR QL: NEGATIVE
QUALITY CONTROL: YES

## 2019-07-02 PROCEDURE — 81025 URINE PREGNANCY TEST: CPT

## 2019-07-02 PROCEDURE — 58300 INSERT INTRAUTERINE DEVICE: CPT

## 2019-07-02 NOTE — PROCEDURE
[IUD Placement] : intrauterine device (IUD) placement [Prevention of Pregnancy] : prevention of pregnancy [Risks] : risks [Benefits] : benefits [Patient] : patient [Alternatives] : alternatives [Infection] : infection [Bleeding] : bleeding [Expulsion] : expulsion [Pain] : pain [Failure] : failure [Uterine Perforation] : uterine perforation [CONSENT OBTAINED] : written consent was obtained prior to the procedure. [LMP ___] : LMP was [unfilled] [Neg Pregnancy Test] : a pregnancy test was negative [Betadine] : Prepped with Betadine [1%] : 1% [Paracervical Block] : A paracervical block was performed using [Without Epi] : without epinephrine [Required Dilation] : required dilation [Tenaculum] : a single toothed tenaculum [Lot Number: ___] : IUD lot number: [unfilled] [Tolerated Well] : the patient tolerated the procedure well [No Complications] : there were no complications

## 2019-07-12 ENCOUNTER — APPOINTMENT (OUTPATIENT)
Dept: ANTEPARTUM | Facility: CLINIC | Age: 29
End: 2019-07-12

## 2019-11-19 NOTE — OB NEONATOLOGY/PEDIATRICIAN DELIVERY SUMMARY - BABY A: APGAR 5 MIN RESP RATE, DELIVERY
(2) good, crying
,[email or web address]

## 2019-11-21 ENCOUNTER — APPOINTMENT (OUTPATIENT)
Dept: OBGYN | Facility: CLINIC | Age: 29
End: 2019-11-21

## 2019-11-27 PROBLEM — Z28.21 REFUSED INFLUENZA VACCINE: Status: ACTIVE | Noted: 2018-10-11

## 2020-01-21 ENCOUNTER — ASOB RESULT (OUTPATIENT)
Age: 30
End: 2020-01-21

## 2020-01-21 ENCOUNTER — APPOINTMENT (OUTPATIENT)
Dept: ANTEPARTUM | Facility: CLINIC | Age: 30
End: 2020-01-21
Payer: MEDICAID

## 2020-01-21 PROCEDURE — 76830 TRANSVAGINAL US NON-OB: CPT

## 2020-01-21 PROCEDURE — 76856 US EXAM PELVIC COMPLETE: CPT | Mod: 59

## 2020-02-05 ENCOUNTER — APPOINTMENT (OUTPATIENT)
Dept: OBGYN | Facility: CLINIC | Age: 30
End: 2020-02-05
Payer: MEDICAID

## 2020-02-05 VITALS
DIASTOLIC BLOOD PRESSURE: 81 MMHG | WEIGHT: 158.38 LBS | SYSTOLIC BLOOD PRESSURE: 140 MMHG | BODY MASS INDEX: 26.36 KG/M2

## 2020-02-05 PROCEDURE — 99213 OFFICE O/P EST LOW 20 MIN: CPT

## 2020-02-05 NOTE — PHYSICAL EXAM
[Awake] : awake [Alert] : alert [Acute Distress] : no acute distress [Mass] : no breast mass [Nipple Discharge] : no nipple discharge [Axillary LAD] : no axillary lymphadenopathy [Tender] : non tender [Oriented x3] : oriented to person, place, and time [Soft] : soft [Normal] : cervix [No Bleeding] : there was no active vaginal bleeding [Uterine Adnexae] : were not tender and not enlarged

## 2020-02-07 DIAGNOSIS — Z30.40 ENCOUNTER FOR SURVEILLANCE OF CONTRACEPTIVES, UNSPECIFIED: ICD-10-CM

## 2020-02-07 RX ORDER — PRENATAL VIT NO.130/IRON/FOLIC 27MG-0.8MG
28-0.8 TABLET ORAL DAILY
Qty: 90 | Refills: 2 | Status: DISCONTINUED | COMMUNITY
Start: 2018-09-12 | End: 2020-02-07

## 2020-02-07 RX ORDER — NORETHINDRONE ACETATE AND ETHINYL ESTRADIOL, ETHINYL ESTRADIOL AND FERROUS FUMARATE 1MG-10(24)
1 MG-10 MCG / KIT ORAL DAILY
Qty: 90 | Refills: 3 | Status: DISCONTINUED | COMMUNITY
Start: 2020-02-05 | End: 2020-02-07

## 2020-02-11 NOTE — OB RN PREOPERATIVE CHECKLIST - NS_PREOPVERIFPROC_OBGYN_ALL_OB
The patient has been examined and the H&P has been reviewed:    I concur with the findings and no changes have occurred since H&P was written.    Anesthesia/Surgery risks, benefits and alternative options discussed and understood by patient/family.          Active Hospital Problems    Diagnosis  POA    Acquired trigger finger of right middle finger [M65.331]  Yes      Resolved Hospital Problems   No resolved problems to display.     
done

## 2020-03-18 ENCOUNTER — APPOINTMENT (OUTPATIENT)
Dept: OBGYN | Facility: CLINIC | Age: 30
End: 2020-03-18

## 2020-04-29 ENCOUNTER — RX RENEWAL (OUTPATIENT)
Age: 30
End: 2020-04-29

## 2020-06-25 ENCOUNTER — APPOINTMENT (OUTPATIENT)
Dept: CARDIOLOGY | Facility: CLINIC | Age: 30
End: 2020-06-25
Payer: MEDICAID

## 2020-06-25 ENCOUNTER — NON-APPOINTMENT (OUTPATIENT)
Age: 30
End: 2020-06-25

## 2020-06-25 VITALS
WEIGHT: 147 LBS | OXYGEN SATURATION: 99 % | HEART RATE: 100 BPM | SYSTOLIC BLOOD PRESSURE: 130 MMHG | HEIGHT: 65 IN | DIASTOLIC BLOOD PRESSURE: 72 MMHG | BODY MASS INDEX: 24.49 KG/M2

## 2020-06-25 PROCEDURE — 99204 OFFICE O/P NEW MOD 45 MIN: CPT | Mod: 25

## 2020-06-25 PROCEDURE — 93000 ELECTROCARDIOGRAM COMPLETE: CPT

## 2020-06-25 NOTE — HISTORY OF PRESENT ILLNESS
[FreeTextEntry1] : Pt is a 30 y/o F who is referred here today by their PCP for evaluation.  She tells me that she had a heart murmur as a child for which she would get yearly evaluations - unclear what her diagnosis was.  \par She has been getting constant chest pain - stabbing feeling, lasts all day sometimes, intermittent.  If she moves too much or too fast can make it worse.  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

## 2020-06-25 NOTE — DISCUSSION/SUMMARY
[FreeTextEntry1] : Pt is a 28 y/o F with PMH murmur p/w atypical CP, palpitations\par Will check transthoracic echocardiogram to evaluate left ventricular function and assess for any structural abnormalities\par will perform ETT to assess patient's current cardiac reserve to incremental activity and check for provocable ECG changes.\par check garcia monitor\par check labs\par VSS\par Pt will return in 1-2 mnths or sooner as needed but I encouraged communication via phone or portal if necessary. \par The described plan was discussed with the pt.  All questions and concerns were addressed to the best of my knowledge.

## 2020-07-23 ENCOUNTER — APPOINTMENT (OUTPATIENT)
Dept: CARDIOLOGY | Facility: CLINIC | Age: 30
End: 2020-07-23
Payer: MEDICAID

## 2020-07-23 PROCEDURE — 93306 TTE W/DOPPLER COMPLETE: CPT

## 2020-07-23 PROCEDURE — 93015 CV STRESS TEST SUPVJ I&R: CPT

## 2020-08-06 ENCOUNTER — APPOINTMENT (OUTPATIENT)
Dept: CARDIOLOGY | Facility: CLINIC | Age: 30
End: 2020-08-06

## 2020-10-30 ENCOUNTER — TRANSCRIPTION ENCOUNTER (OUTPATIENT)
Age: 30
End: 2020-10-30

## 2021-02-26 ENCOUNTER — RX RENEWAL (OUTPATIENT)
Age: 31
End: 2021-02-26

## 2021-04-27 ENCOUNTER — APPOINTMENT (OUTPATIENT)
Dept: OBGYN | Facility: CLINIC | Age: 31
End: 2021-04-27
Payer: MEDICAID

## 2021-04-27 VITALS — DIASTOLIC BLOOD PRESSURE: 83 MMHG | SYSTOLIC BLOOD PRESSURE: 118 MMHG | WEIGHT: 150 LBS | BODY MASS INDEX: 24.96 KG/M2

## 2021-04-27 PROCEDURE — 99072 ADDL SUPL MATRL&STAF TM PHE: CPT

## 2021-04-27 PROCEDURE — 99395 PREV VISIT EST AGE 18-39: CPT

## 2021-05-11 LAB — CYTOLOGY CVX/VAG DOC THIN PREP: NORMAL

## 2021-06-24 ENCOUNTER — APPOINTMENT (OUTPATIENT)
Dept: CARDIOLOGY | Facility: CLINIC | Age: 31
End: 2021-06-24

## 2021-07-15 ENCOUNTER — APPOINTMENT (OUTPATIENT)
Dept: CARDIOLOGY | Facility: CLINIC | Age: 31
End: 2021-07-15

## 2021-10-11 ENCOUNTER — APPOINTMENT (OUTPATIENT)
Dept: ORTHOPEDIC SURGERY | Facility: CLINIC | Age: 31
End: 2021-10-11

## 2021-10-29 ENCOUNTER — APPOINTMENT (OUTPATIENT)
Dept: OBGYN | Facility: CLINIC | Age: 31
End: 2021-10-29
Payer: MEDICAID

## 2021-10-29 VITALS
BODY MASS INDEX: 24.35 KG/M2 | HEIGHT: 65 IN | WEIGHT: 146.13 LBS | DIASTOLIC BLOOD PRESSURE: 76 MMHG | SYSTOLIC BLOOD PRESSURE: 110 MMHG

## 2021-10-29 DIAGNOSIS — N94.6 DYSMENORRHEA, UNSPECIFIED: ICD-10-CM

## 2021-10-29 DIAGNOSIS — N64.4 MASTODYNIA: ICD-10-CM

## 2021-10-29 PROCEDURE — 99214 OFFICE O/P EST MOD 30 MIN: CPT

## 2021-10-29 NOTE — PHYSICAL EXAM
[Chaperone Present] : A chaperone was present in the examining room during all aspects of the physical examination [Appropriately responsive] : appropriately responsive [Alert] : alert [No Acute Distress] : no acute distress [No Lymphadenopathy] : no lymphadenopathy [Regular Rate Rhythm] : regular rate rhythm [No Murmurs] : no murmurs [Clear to Auscultation B/L] : clear to auscultation bilaterally [Tender] : tender [Soft] : soft [Non-tender] : non-tender [Non-distended] : non-distended [No HSM] : No HSM [No Lesions] : no lesions [No Mass] : no mass [Oriented x3] : oriented x3 [Examination Of The Breasts] : a normal appearance [No Masses] : no breast masses were palpable [Labia Majora] : normal [Labia Minora] : normal [IUD String] : an IUD string was noted [Normal] : normal [Tenderness] : tender [Enlarged ___ wks] : enlarged [unfilled] ~Uweeks [Uterine Adnexae] : normal

## 2021-10-29 NOTE — COUNSELING
[Nutrition/ Exercise/ Weight Management] : nutrition, exercise, weight management [Body Image] : body image [Vitamins/Supplements] : vitamins/supplements [Sunscreen] : sunscreen [Drugs/Alcohol] : drugs, alcohol [Breast Self Exam] : breast self exam [Bladder Hygiene] : bladder hygiene [Intimate Partner Violence] : intimate partner violence [Sexual Abuse] : sexual abuse [Confidentiality] : confidentiality [STD (testing, results, tx)] : STD (testing, results, tx) [Vaccines] : vaccines

## 2021-11-05 ENCOUNTER — APPOINTMENT (OUTPATIENT)
Dept: ANTEPARTUM | Facility: CLINIC | Age: 31
End: 2021-11-05
Payer: MEDICAID

## 2021-11-05 ENCOUNTER — ASOB RESULT (OUTPATIENT)
Age: 31
End: 2021-11-05

## 2021-11-05 PROCEDURE — 76830 TRANSVAGINAL US NON-OB: CPT

## 2021-11-05 PROCEDURE — 76856 US EXAM PELVIC COMPLETE: CPT | Mod: 59

## 2021-12-09 ENCOUNTER — APPOINTMENT (OUTPATIENT)
Dept: OBGYN | Facility: CLINIC | Age: 31
End: 2021-12-09

## 2021-12-16 ENCOUNTER — APPOINTMENT (OUTPATIENT)
Dept: OBGYN | Facility: CLINIC | Age: 31
End: 2021-12-16
Payer: MEDICAID

## 2021-12-16 VITALS — DIASTOLIC BLOOD PRESSURE: 66 MMHG | SYSTOLIC BLOOD PRESSURE: 110 MMHG | HEIGHT: 65 IN

## 2021-12-16 DIAGNOSIS — N92.6 IRREGULAR MENSTRUATION, UNSPECIFIED: ICD-10-CM

## 2021-12-16 DIAGNOSIS — Z97.5 PRESENCE OF (INTRAUTERINE) CONTRACEPTIVE DEVICE: ICD-10-CM

## 2021-12-16 DIAGNOSIS — N93.9 ABNORMAL UTERINE AND VAGINAL BLEEDING, UNSPECIFIED: ICD-10-CM

## 2021-12-16 LAB
HCG UR QL: NEGATIVE
QUALITY CONTROL: YES

## 2021-12-16 PROCEDURE — 58301 REMOVE INTRAUTERINE DEVICE: CPT

## 2021-12-16 PROCEDURE — 58558Z: CUSTOM

## 2021-12-16 NOTE — PROCEDURE
[Hysteroscopy] : Hysteroscopy [Time out performed] : Pre-procedure time out performed.  Patient's name, date of birth and procedure confirmed. [Consent Obtained] : Consent obtained [Abnormal uterine bleeding] : abnormal uterine bleeding [Risks] : risks [Benefits] : benefits [Alternatives] : alternatives [Patient] : patient [Infection] : infection [Bleeding] : bleeding [Allergic Reaction] : allergic reaction [Lidocaine___ mL] : [unfilled] ~UmL of lidocaine [flexible] : Using aseptic technique a hysteroscopy was performed using a flexible hysteroscope [Sent to Pathology] : specimen was placed in buffered formalin and sent for pathology [Antibiotics given] : antibiotics given [Hemostasis obtained] : hemostasis obtained [Tolerated Well] : Patient tolerated the procedure well [Aftercare instructions/regstrictions given and follow-up scheduled] : Aftercare instructions/restrictions given and follow-up scheduled [de-identified] : timeout performed\par under sterile conditions and with paracervical block and the IUD was removed uneventfully and cervix was dilated and endometrial sampling obtained and sent to pathology.\par hysteroscopy revealed normal endometrial contour with atrophic changes. No polyps or myomas noted. Patient tolerated the procedure well.

## 2022-01-06 LAB — CORE LAB BIOPSY: NORMAL

## 2022-01-27 ENCOUNTER — APPOINTMENT (OUTPATIENT)
Dept: OBGYN | Facility: CLINIC | Age: 32
End: 2022-01-27

## 2022-03-15 ENCOUNTER — APPOINTMENT (OUTPATIENT)
Dept: CARDIOLOGY | Facility: CLINIC | Age: 32
End: 2022-03-15
Payer: MEDICAID

## 2022-03-15 ENCOUNTER — NON-APPOINTMENT (OUTPATIENT)
Age: 32
End: 2022-03-15

## 2022-03-15 VITALS
HEIGHT: 65 IN | WEIGHT: 142 LBS | DIASTOLIC BLOOD PRESSURE: 80 MMHG | OXYGEN SATURATION: 96 % | HEART RATE: 105 BPM | SYSTOLIC BLOOD PRESSURE: 110 MMHG | BODY MASS INDEX: 23.66 KG/M2

## 2022-03-15 PROCEDURE — 99214 OFFICE O/P EST MOD 30 MIN: CPT | Mod: 25

## 2022-03-15 PROCEDURE — 93242 EXT ECG>48HR<7D RECORDING: CPT

## 2022-03-15 PROCEDURE — 93000 ELECTROCARDIOGRAM COMPLETE: CPT | Mod: 59

## 2022-03-15 RX ORDER — NORETHINDRONE ACETATE AND ETHINYL ESTRADIOL AND FERROUS FUMARATE 1MG-20(21)
1-20 KIT ORAL DAILY
Qty: 90 | Refills: 3 | Status: DISCONTINUED | COMMUNITY
Start: 2021-04-27 | End: 2022-03-15

## 2022-03-15 RX ORDER — DESOGESTREL AND ETHINYL ESTRADIOL 0.15-0.03
0.15-3 KIT ORAL DAILY
Qty: 90 | Refills: 1 | Status: DISCONTINUED | COMMUNITY
Start: 2021-12-16 | End: 2022-03-15

## 2022-03-15 RX ORDER — PRENATAL VIT NO.126/IRON/FOLIC 28MG-0.8MG
28-0.8 TABLET ORAL
Qty: 90 | Refills: 0 | Status: DISCONTINUED | COMMUNITY
Start: 2019-02-19 | End: 2022-03-15

## 2022-03-15 RX ORDER — NORGESTIMATE AND ETHINYL ESTRADIOL 7DAYSX3 LO
0.18/0.215/0.25 KIT ORAL
Qty: 84 | Refills: 0 | Status: DISCONTINUED | COMMUNITY
Start: 2020-04-29 | End: 2022-03-15

## 2022-03-15 RX ORDER — NORGESTIMATE AND ETHINYL ESTRADIOL 7DAYSX3 LO
0.18/0.215/0.25 KIT ORAL DAILY
Qty: 3 | Refills: 0 | Status: DISCONTINUED | COMMUNITY
Start: 2020-02-07 | End: 2022-03-15

## 2022-04-06 ENCOUNTER — APPOINTMENT (OUTPATIENT)
Dept: CARDIOLOGY | Facility: CLINIC | Age: 32
End: 2022-04-06
Payer: MEDICAID

## 2022-04-06 PROCEDURE — 93015 CV STRESS TEST SUPVJ I&R: CPT

## 2022-04-08 ENCOUNTER — APPOINTMENT (OUTPATIENT)
Dept: CARDIOLOGY | Facility: CLINIC | Age: 32
End: 2022-04-08
Payer: MEDICAID

## 2022-04-08 PROCEDURE — 93306 TTE W/DOPPLER COMPLETE: CPT

## 2022-04-12 ENCOUNTER — TRANSCRIPTION ENCOUNTER (OUTPATIENT)
Age: 32
End: 2022-04-12

## 2022-04-21 NOTE — HISTORY OF PRESENT ILLNESS
[FreeTextEntry1] : Pt is a 30 y/o F who presents today for CP, palpitations.  The palpitations occur daily on/off.  She feels flushed during episode which lasts seconds to minutes\par She has been getting constant chest pain - stabbing feeling or pressure, lasts all day sometimes, intermittent. 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

## 2022-04-21 NOTE — DISCUSSION/SUMMARY
[FreeTextEntry1] : Pt is a 32 y/o F p/w atypical CP, palpitations\par Will check transthoracic echocardiogram to evaluate left ventricular function and assess for any structural abnormalities\par will perform ETT to assess patient's current cardiac reserve to incremental activity and check for provocable ECG changes.\par Given pt's symptoms will check garcia monitor to assess for ectopy.  Advised adequate hydration.  Drug use, OTC medication use, caffeine consumption reviewed \par VSS\par The described plan was discussed with the pt.  All questions and concerns were addressed to the best of my knowledge.

## 2022-06-14 ENCOUNTER — APPOINTMENT (OUTPATIENT)
Dept: CARDIOLOGY | Facility: CLINIC | Age: 32
End: 2022-06-14
Payer: MEDICAID

## 2022-06-14 ENCOUNTER — NON-APPOINTMENT (OUTPATIENT)
Age: 32
End: 2022-06-14

## 2022-06-14 VITALS
BODY MASS INDEX: 24.75 KG/M2 | SYSTOLIC BLOOD PRESSURE: 124 MMHG | WEIGHT: 145 LBS | DIASTOLIC BLOOD PRESSURE: 72 MMHG | HEART RATE: 90 BPM | HEIGHT: 64 IN

## 2022-06-14 DIAGNOSIS — E05.90 THYROTOXICOSIS, UNSPECIFIED W/OUT THYROTOXIC CRISIS OR STORM: ICD-10-CM

## 2022-06-14 PROCEDURE — 93000 ELECTROCARDIOGRAM COMPLETE: CPT

## 2022-06-14 PROCEDURE — 99214 OFFICE O/P EST MOD 30 MIN: CPT | Mod: 25

## 2022-06-14 RX ORDER — METHIMAZOLE 10 MG/1
10 TABLET ORAL
Qty: 60 | Refills: 0 | Status: ACTIVE | COMMUNITY
Start: 2022-05-12

## 2022-06-14 NOTE — DISCUSSION/SUMMARY
[FreeTextEntry1] : Pt is a 30 y/o F p/w atypical CP, palpitations\par \par TTE 07/2020 EF 64% without significant valvular disease\par TTE 04/2022 EF 68%, min AI/TR\par garcia 10/2020 NSR avg HR 98 without ectopy\par garcia 03/2022 NSR avg \par ETT 07/2020 no ischemic changes, 9 min\par ETT 04/2022 11 METS, no ischemic changes, 10 min\par \par palpitations/hyperthyroidism:\par increase metoprolol succ to 50mg qd\par Stay well hydrated, avoid caffeine\par Following with endo\par VSS\par \par Pt will return in 4 mnths or sooner as needed but I encouraged communication via phone or portal if necessary. \par The described plan was discussed with the pt.  All questions and concerns were addressed to the best of my knowledge.

## 2022-06-14 NOTE — HISTORY OF PRESENT ILLNESS
[FreeTextEntry1] : Pt is a 32 y/o F who presents today for CP, palpitations.  The palpitations occur daily on/off.  She has started metoprolol and was recently found to have hyperthyroidism and was started on methimazole.  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

## 2022-07-22 NOTE — ED ADULT NURSE NOTE - PRIMARY CARE PROVIDER
PT called & she is feeling very sick. She has been dizzy, coughing, feels weak, vomiting, chills, & bad headaches. She is wanting something called in if you can.     Lauri Patel MD

## 2022-10-14 NOTE — OB RN PATIENT PROFILE - HEIGHT IN FEET
Date: 10/13/22  Procedure: Labor Epidural         Relevant Problems   NEURO   (positive) History of DVT (deep vein thrombosis)   (positive) Seizure (HCC)      Other   (positive) Acute encephalopathy   (positive) Labor and delivery, indication for care   (positive) Transaminitis       Physical Exam    Airway   Mallampati: II  TM distance: >3 FB  Neck ROM: full       Cardiovascular - normal exam  Rhythm: regular  Rate: normal  (-) murmur     Dental - normal exam           Pulmonary - normal exam  Breath sounds clear to auscultation     Abdominal    Neurological - normal exam                 Anesthesia Plan    ASA 3   ASA physical status 3 criteria: alcohol and/or substance dependence or abuse    Plan - epidural   Neuraxial block will be labor analgesia                  Pertinent diagnostic labs and testing reviewed    Informed Consent:    Anesthetic plan and risks discussed with patient.      Patient with hx of unprovoked DVT, last took heparin 7500 U at 17:30 yesterday (BID dosing).   
5

## 2023-01-24 ENCOUNTER — APPOINTMENT (OUTPATIENT)
Dept: CARDIOLOGY | Facility: CLINIC | Age: 33
End: 2023-01-24
Payer: MEDICAID

## 2023-01-24 ENCOUNTER — NON-APPOINTMENT (OUTPATIENT)
Age: 33
End: 2023-01-24

## 2023-01-24 VITALS
BODY MASS INDEX: 25.78 KG/M2 | WEIGHT: 151 LBS | HEART RATE: 66 BPM | DIASTOLIC BLOOD PRESSURE: 60 MMHG | SYSTOLIC BLOOD PRESSURE: 102 MMHG | HEIGHT: 64 IN | OXYGEN SATURATION: 98 %

## 2023-01-24 DIAGNOSIS — R07.9 CHEST PAIN, UNSPECIFIED: ICD-10-CM

## 2023-01-24 DIAGNOSIS — R00.2 PALPITATIONS: ICD-10-CM

## 2023-01-24 PROCEDURE — 93242 EXT ECG>48HR<7D RECORDING: CPT

## 2023-01-24 PROCEDURE — 93000 ELECTROCARDIOGRAM COMPLETE: CPT | Mod: 59

## 2023-01-24 PROCEDURE — 99214 OFFICE O/P EST MOD 30 MIN: CPT | Mod: 25

## 2023-01-24 RX ORDER — ATOGEPANT 60 MG/1
60 TABLET ORAL
Refills: 0 | Status: ACTIVE | COMMUNITY

## 2023-01-24 RX ORDER — UBROGEPANT 100 MG/1
100 TABLET ORAL
Refills: 0 | Status: ACTIVE | COMMUNITY

## 2023-01-24 RX ORDER — CITALOPRAM HYDROBROMIDE 10 MG/1
10 TABLET, FILM COATED ORAL
Refills: 0 | Status: ACTIVE | COMMUNITY

## 2023-01-24 RX ORDER — METHIMAZOLE 5 MG/1
5 TABLET ORAL
Qty: 90 | Refills: 0 | Status: DISCONTINUED | COMMUNITY
Start: 2022-06-01 | End: 2023-01-24

## 2023-01-24 NOTE — DISCUSSION/SUMMARY
[FreeTextEntry1] : Pt is a 31 y/o F p/w atypical CP, palpitations\par \par TTE 07/2020 EF 64% without significant valvular disease\par TTE 04/2022 EF 68%, min AI/TR\par garcia 10/2020 NSR avg HR 98 without ectopy\par garcia 03/2022 NSR avg \par ETT 07/2020 no ischemic changes, 9 min\par ETT 04/2022 11 METS, no ischemic changes, 10 min\par \par palpitations/CP:\par she tells me that thyroid levels have been normal - will try to get records\par repeat garcia\par c/w metoprolol succ 50mg qd\par Stay well hydrated, avoid caffeine\par Following with endo\par check CCTA to eval for CAD\par VSS\par \par dizziness/room spinning:\par likely vertigo\par refer to ENT\par \par The described plan was discussed with the pt.  All questions and concerns were addressed to the best of my knowledge.

## 2023-01-24 NOTE — HISTORY OF PRESENT ILLNESS
[FreeTextEntry1] : Pt is a 31 y/o F who presents today for CP (stabbing in nature), palpitations.  She states that for the past several weeks her symptoms have worsened.  Symptoms are daily/constant.  Last night she felt room spinning and as if her eyes shifted.  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

## 2023-01-27 ENCOUNTER — APPOINTMENT (OUTPATIENT)
Dept: RHEUMATOLOGY | Facility: CLINIC | Age: 33
End: 2023-01-27

## 2023-02-21 ENCOUNTER — NON-APPOINTMENT (OUTPATIENT)
Age: 33
End: 2023-02-21

## 2023-03-08 ENCOUNTER — APPOINTMENT (OUTPATIENT)
Dept: CARDIOLOGY | Facility: CLINIC | Age: 33
End: 2023-03-08
Payer: MEDICAID

## 2023-03-08 PROCEDURE — 93015 CV STRESS TEST SUPVJ I&R: CPT

## 2023-04-04 NOTE — DISCHARGE NOTE OB - CARE PROVIDER_API CALL
Gt Nettles)  Obstetrics and Gynecology  [address], [address]  [place] [address]  Phone: [phone]  Fax: [phone]  Follow Up Time:
Previously Declined (within the last year)

## 2023-05-15 ENCOUNTER — RX RENEWAL (OUTPATIENT)
Age: 33
End: 2023-05-15

## 2023-07-24 ENCOUNTER — RX RENEWAL (OUTPATIENT)
Age: 33
End: 2023-07-24

## 2023-08-01 ENCOUNTER — APPOINTMENT (OUTPATIENT)
Dept: OBGYN | Facility: CLINIC | Age: 33
End: 2023-08-01
Payer: MEDICAID

## 2023-08-01 VITALS
BODY MASS INDEX: 26.12 KG/M2 | SYSTOLIC BLOOD PRESSURE: 120 MMHG | HEIGHT: 64 IN | DIASTOLIC BLOOD PRESSURE: 82 MMHG | WEIGHT: 153 LBS

## 2023-08-01 DIAGNOSIS — Z01.419 ENCOUNTER FOR GYNECOLOGICAL EXAMINATION (GENERAL) (ROUTINE) W/OUT ABNORMAL FINDINGS: ICD-10-CM

## 2023-08-01 PROCEDURE — 99395 PREV VISIT EST AGE 18-39: CPT

## 2023-08-01 NOTE — PHYSICAL EXAM
[Chaperone Present] : A chaperone was present in the examining room during all aspects of the physical examination [FreeTextEntry1] : Tiffanie [Appropriately responsive] : appropriately responsive [Alert] : alert [No Acute Distress] : no acute distress [No Lymphadenopathy] : no lymphadenopathy [Regular Rate Rhythm] : regular rate rhythm [No Murmurs] : no murmurs [Clear to Auscultation B/L] : clear to auscultation bilaterally [Tender] : tender [Soft] : soft [Non-tender] : non-tender [Non-distended] : non-distended [No HSM] : No HSM [No Lesions] : no lesions [No Mass] : no mass [Oriented x3] : oriented x3 [Examination Of The Breasts] : a normal appearance [No Masses] : no breast masses were palpable [Labia Majora] : normal [Labia Minora] : normal [IUD String] : an IUD string was noted [Normal] : normal [Tenderness] : tender [Enlarged ___ wks] : enlarged [unfilled] ~Uweeks [Uterine Adnexae] : normal

## 2023-08-01 NOTE — HISTORY OF PRESENT ILLNESS
[FreeTextEntry1] : 32-year-old -0-0-2 last menstrual cycle was July 10, 2023 presents for GYN evaluation.  She is sexually active and uses nothing for contraception.  History of LEEP procedure for abnormal Pap smear.  Also diagnosed with MS and will start treatment in about 2 weeks.

## 2023-08-01 NOTE — DISCUSSION/SUMMARY
[FreeTextEntry1] : 32-year-old G2, P2 presents for GYN evaluation.  History of LEEP procedure for abnormal Pap.  Patient is sexually active and will seek contraceptive methods after she consults with her PMD treating her for MS.  Physical examination is normal.  Pap GC chlamydia sent.  Return in 3 months for follow-up.

## 2023-08-04 LAB — CYTOLOGY CVX/VAG DOC THIN PREP: NORMAL

## 2023-10-23 ENCOUNTER — RX RENEWAL (OUTPATIENT)
Age: 33
End: 2023-10-23

## 2023-10-23 RX ORDER — METOPROLOL SUCCINATE 50 MG/1
50 TABLET, EXTENDED RELEASE ORAL
Qty: 90 | Refills: 1 | Status: ACTIVE | COMMUNITY
Start: 2022-03-30

## 2023-11-06 ENCOUNTER — RX RENEWAL (OUTPATIENT)
Age: 33
End: 2023-11-06

## 2023-11-06 RX ORDER — METOPROLOL SUCCINATE 25 MG/1
25 TABLET, EXTENDED RELEASE ORAL
Qty: 90 | Refills: 0 | Status: ACTIVE | COMMUNITY
Start: 2023-05-15 | End: 1900-01-01

## 2023-11-27 ENCOUNTER — APPOINTMENT (OUTPATIENT)
Dept: OBGYN | Facility: CLINIC | Age: 33
End: 2023-11-27
Payer: MEDICAID

## 2023-11-27 VITALS
HEIGHT: 64 IN | DIASTOLIC BLOOD PRESSURE: 78 MMHG | BODY MASS INDEX: 27.31 KG/M2 | SYSTOLIC BLOOD PRESSURE: 110 MMHG | WEIGHT: 160 LBS

## 2023-11-27 DIAGNOSIS — N60.19 DIFFUSE CYSTIC MASTOPATHY OF UNSPECIFIED BREAST: ICD-10-CM

## 2023-11-27 PROCEDURE — 99213 OFFICE O/P EST LOW 20 MIN: CPT

## 2023-12-05 ENCOUNTER — NON-APPOINTMENT (OUTPATIENT)
Age: 33
End: 2023-12-05

## 2023-12-15 ENCOUNTER — APPOINTMENT (OUTPATIENT)
Dept: ULTRASOUND IMAGING | Facility: CLINIC | Age: 33
End: 2023-12-15
Payer: MEDICAID

## 2023-12-15 ENCOUNTER — OUTPATIENT (OUTPATIENT)
Dept: OUTPATIENT SERVICES | Facility: HOSPITAL | Age: 33
LOS: 1 days | End: 2023-12-15
Payer: MEDICAID

## 2023-12-15 ENCOUNTER — RESULT REVIEW (OUTPATIENT)
Age: 33
End: 2023-12-15

## 2023-12-15 DIAGNOSIS — Z00.8 ENCOUNTER FOR OTHER GENERAL EXAMINATION: ICD-10-CM

## 2023-12-15 DIAGNOSIS — Z98.890 OTHER SPECIFIED POSTPROCEDURAL STATES: Chronic | ICD-10-CM

## 2023-12-15 DIAGNOSIS — O34.30 MATERNAL CARE FOR CERVICAL INCOMPETENCE, UNSPECIFIED TRIMESTER: Chronic | ICD-10-CM

## 2023-12-15 DIAGNOSIS — Z98.891 HISTORY OF UTERINE SCAR FROM PREVIOUS SURGERY: Chronic | ICD-10-CM

## 2023-12-15 PROCEDURE — 76641 ULTRASOUND BREAST COMPLETE: CPT | Mod: 26,50

## 2023-12-15 PROCEDURE — 76641 ULTRASOUND BREAST COMPLETE: CPT

## 2024-01-04 ENCOUNTER — APPOINTMENT (OUTPATIENT)
Dept: OBGYN | Facility: CLINIC | Age: 34
End: 2024-01-04

## 2024-05-06 NOTE — OB PROVIDER TRIAGE NOTE - NS_FHRDECEL_OBGYN_ALL_OB
No Decelerations Continue Regimen: Absorica 30mg, fluocinolone 0.01 % oil - Apply to affected areas in scalp QD-BID PRN Detail Level: Simple Plan: Continue using mild topical steroids for maintenance of recurring rashes on neck.

## 2024-06-26 NOTE — OB RN PATIENT PROFILE - PRO HBSAG INFANT
The patient is Stable - Low risk of patient condition declining or worsening    Shift Goals  Clinical Goals: VSS, BP managemetn  Patient Goals: Rest  Family Goals: EDWARD    Progress made toward(s) clinical / shift goals:    Problem: Pain - Standard  Goal: Alleviation of pain or a reduction in pain to the patient’s comfort goal  Outcome: Progressing     Problem: Knowledge Deficit - Standard  Goal: Patient and family/care givers will demonstrate understanding of plan of care, disease process/condition, diagnostic tests and medications  Outcome: Progressing     Problem: Psychosocial  Goal: Patient's level of anxiety will decrease  Outcome: Progressing  Goal: Patient's ability to verbalize feelings about condition will improve  Outcome: Progressing     Problem: Communication  Goal: The ability to communicate needs accurately and effectively will improve  Outcome: Progressing     Problem: Mobility  Goal: Patient's capacity to carry out activities will improve  Outcome: Progressing     Problem: Self Care  Goal: Patient will have the ability to perform ADLs independently or with assistance (bathe, groom, dress, toilet and feed)  Outcome: Progressing       Patient is not progressing towards the following goals:       negative

## 2024-12-24 PROBLEM — F10.90 ALCOHOL USE: Status: INACTIVE | Noted: 2017-10-23

## 2025-01-21 NOTE — OB PROVIDER H&P - NSHPPHYSICALEXAM_GEN_ALL_CORE
room air
Const: Awake, alert and oriented x3. In No Acute Distress. Well appearing.  HEENT: NC/AT, PERRLA, EOMI, clear nares, Moist mucous membranes. No erythema, lesions, secretions.   Neck:. Soft and supple. Full ROM without pain.  Cardiovascular: Regular rate and regular rhythm. +S1/S2. No murmurs. Peripheral pulses 2+ and symmetric. No LE edema.  Respiratory: Speaking in full sentences. No evidence of respiratory distress. CTAB. No wheezes, crackles, rales or rhonchi.  Abd: Gravid Abdomen, Normal bowel sounds in all 4 quadrants, Soft, non-distended. non-tender. No guarding or rebound. Negative Fox, McBurney, Rovsing.

## 2025-01-23 ENCOUNTER — APPOINTMENT (OUTPATIENT)
Dept: OBGYN | Facility: CLINIC | Age: 35
End: 2025-01-23
Payer: MEDICAID

## 2025-01-23 VITALS
HEIGHT: 64 IN | SYSTOLIC BLOOD PRESSURE: 120 MMHG | BODY MASS INDEX: 26.8 KG/M2 | DIASTOLIC BLOOD PRESSURE: 76 MMHG | WEIGHT: 157 LBS

## 2025-01-23 DIAGNOSIS — N92.1 EXCESSIVE AND FREQUENT MENSTRUATION WITH IRREGULAR CYCLE: ICD-10-CM
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
## 2025-01-23 DIAGNOSIS — N84.1 POLYP OF CERVIX UTERI: ICD-10-CM

## 2025-01-23 DIAGNOSIS — T19.2XXA FOREIGN BODY IN VULVA AND VAGINA, INITIAL ENCOUNTER: ICD-10-CM

## 2025-01-23 PROCEDURE — 99395 PREV VISIT EST AGE 18-39: CPT

## 2025-01-23 PROCEDURE — 99215 OFFICE O/P EST HI 40 MIN: CPT | Mod: 25

## 2025-01-23 PROCEDURE — 99459 PELVIC EXAMINATION: CPT

## 2025-01-23 RX ORDER — FLUCONAZOLE 200 MG/1
200 TABLET ORAL
Qty: 2 | Refills: 3 | Status: ACTIVE | COMMUNITY
Start: 2025-01-23 | End: 1900-01-01

## 2025-01-23 RX ORDER — DOXYCYCLINE 100 MG/1
100 CAPSULE ORAL
Qty: 14 | Refills: 1 | Status: ACTIVE | COMMUNITY
Start: 2025-01-23 | End: 1900-01-01

## 2025-01-28 LAB — CYTOLOGY CVX/VAG DOC THIN PREP: ABNORMAL

## 2025-02-14 ENCOUNTER — APPOINTMENT (OUTPATIENT)
Dept: ULTRASOUND IMAGING | Facility: CLINIC | Age: 35
End: 2025-02-14

## 2025-02-26 ENCOUNTER — RESULT REVIEW (OUTPATIENT)
Age: 35
End: 2025-02-26

## 2025-02-26 ENCOUNTER — APPOINTMENT (OUTPATIENT)
Dept: ULTRASOUND IMAGING | Facility: CLINIC | Age: 35
End: 2025-02-26
Payer: MEDICAID

## 2025-02-26 ENCOUNTER — OUTPATIENT (OUTPATIENT)
Dept: OUTPATIENT SERVICES | Facility: HOSPITAL | Age: 35
LOS: 1 days | End: 2025-02-26
Payer: MEDICAID

## 2025-02-26 DIAGNOSIS — N84.1 POLYP OF CERVIX UTERI: ICD-10-CM

## 2025-02-26 DIAGNOSIS — O34.30 MATERNAL CARE FOR CERVICAL INCOMPETENCE, UNSPECIFIED TRIMESTER: Chronic | ICD-10-CM

## 2025-02-26 DIAGNOSIS — Z98.891 HISTORY OF UTERINE SCAR FROM PREVIOUS SURGERY: Chronic | ICD-10-CM

## 2025-02-26 DIAGNOSIS — Z98.890 OTHER SPECIFIED POSTPROCEDURAL STATES: Chronic | ICD-10-CM

## 2025-02-26 PROCEDURE — 76830 TRANSVAGINAL US NON-OB: CPT | Mod: 26

## 2025-02-26 PROCEDURE — 76856 US EXAM PELVIC COMPLETE: CPT

## 2025-02-26 PROCEDURE — 76856 US EXAM PELVIC COMPLETE: CPT | Mod: 26

## 2025-02-26 PROCEDURE — 76830 TRANSVAGINAL US NON-OB: CPT

## 2025-03-20 ENCOUNTER — APPOINTMENT (OUTPATIENT)
Dept: OBGYN | Facility: CLINIC | Age: 35
End: 2025-03-20
Payer: MEDICAID

## 2025-03-20 VITALS
SYSTOLIC BLOOD PRESSURE: 100 MMHG | HEIGHT: 64 IN | WEIGHT: 156 LBS | DIASTOLIC BLOOD PRESSURE: 72 MMHG | BODY MASS INDEX: 26.63 KG/M2

## 2025-03-20 DIAGNOSIS — N92.1 EXCESSIVE AND FREQUENT MENSTRUATION WITH IRREGULAR CYCLE: ICD-10-CM

## 2025-03-20 LAB
HCG UR QL: NEGATIVE
QUALITY CONTROL: YES

## 2025-03-20 PROCEDURE — 58558Z: CUSTOM

## 2025-03-20 PROCEDURE — 81025 URINE PREGNANCY TEST: CPT

## 2025-03-25 LAB — CORE LAB BIOPSY: NORMAL

## 2025-04-17 ENCOUNTER — APPOINTMENT (OUTPATIENT)
Dept: OTOLARYNGOLOGY | Facility: CLINIC | Age: 35
End: 2025-04-17
Payer: MEDICAID

## 2025-04-17 ENCOUNTER — NON-APPOINTMENT (OUTPATIENT)
Age: 35
End: 2025-04-17

## 2025-04-17 VITALS — HEIGHT: 64 IN | BODY MASS INDEX: 26.63 KG/M2 | WEIGHT: 156 LBS

## 2025-04-17 DIAGNOSIS — M26.609 UNSPECIFIED TEMPOROMANDIBULAR JOINT DISORDER: ICD-10-CM

## 2025-04-17 DIAGNOSIS — H60.312 DIFFUSE OTITIS EXTERNA, LEFT EAR: ICD-10-CM

## 2025-04-17 DIAGNOSIS — H92.02 OTALGIA, LEFT EAR: ICD-10-CM

## 2025-04-17 DIAGNOSIS — H90.3 SENSORINEURAL HEARING LOSS, BILATERAL: ICD-10-CM

## 2025-04-17 DIAGNOSIS — H68.022 CHRONIC EUSTACHIAN SALPINGITIS, LEFT EAR: ICD-10-CM

## 2025-04-17 PROCEDURE — 92567 TYMPANOMETRY: CPT

## 2025-04-17 PROCEDURE — 99203 OFFICE O/P NEW LOW 30 MIN: CPT | Mod: 25

## 2025-04-17 PROCEDURE — 92557 COMPREHENSIVE HEARING TEST: CPT

## 2025-04-17 PROCEDURE — 92504 EAR MICROSCOPY EXAMINATION: CPT

## 2025-04-17 RX ORDER — OFLOXACIN OTIC 3 MG/ML
0.3 SOLUTION AURICULAR (OTIC) TWICE DAILY
Qty: 1 | Refills: 3 | Status: ACTIVE | COMMUNITY
Start: 2025-04-17 | End: 1900-01-01

## 2025-04-17 RX ORDER — OCRELIZUMAB 300 MG/10ML
300 INJECTION INTRAVENOUS
Refills: 0 | Status: ACTIVE | COMMUNITY

## 2025-04-24 ENCOUNTER — APPOINTMENT (OUTPATIENT)
Dept: OBGYN | Facility: CLINIC | Age: 35
End: 2025-04-24

## 2025-05-21 DIAGNOSIS — B96.89 ACUTE VAGINITIS: ICD-10-CM

## 2025-05-21 DIAGNOSIS — N76.0 ACUTE VAGINITIS: ICD-10-CM

## 2025-05-22 RX ORDER — METRONIDAZOLE 7.5 MG/G
0.75 GEL VAGINAL
Qty: 1 | Refills: 0 | Status: ACTIVE | COMMUNITY
Start: 2025-05-21 | End: 1900-01-01

## 2025-06-20 ENCOUNTER — APPOINTMENT (OUTPATIENT)
Dept: OBGYN | Facility: CLINIC | Age: 35
End: 2025-06-20
Payer: MEDICAID

## 2025-06-20 VITALS
WEIGHT: 156 LBS | SYSTOLIC BLOOD PRESSURE: 118 MMHG | BODY MASS INDEX: 26.63 KG/M2 | HEIGHT: 64 IN | DIASTOLIC BLOOD PRESSURE: 70 MMHG

## 2025-06-20 PROCEDURE — 99214 OFFICE O/P EST MOD 30 MIN: CPT

## 2025-06-20 RX ORDER — TERCONAZOLE 4 MG/G
0.4 CREAM VAGINAL DAILY
Qty: 1 | Refills: 3 | Status: ACTIVE | COMMUNITY
Start: 2025-06-20 | End: 1900-01-01